# Patient Record
Sex: FEMALE | Race: WHITE | NOT HISPANIC OR LATINO | Employment: UNEMPLOYED | URBAN - METROPOLITAN AREA
[De-identification: names, ages, dates, MRNs, and addresses within clinical notes are randomized per-mention and may not be internally consistent; named-entity substitution may affect disease eponyms.]

---

## 2017-09-27 ENCOUNTER — ALLSCRIPTS OFFICE VISIT (OUTPATIENT)
Dept: OTHER | Facility: OTHER | Age: 47
End: 2017-09-27

## 2017-10-11 RX ORDER — LACTULOSE 20 G/30ML
SOLUTION ORAL
Status: ON HOLD | COMMUNITY
End: 2017-10-13 | Stop reason: ALTCHOICE

## 2017-10-11 RX ORDER — ALPRAZOLAM 0.25 MG/1
TABLET ORAL
COMMUNITY
End: 2018-09-11 | Stop reason: HOSPADM

## 2017-10-11 RX ORDER — ESCITALOPRAM OXALATE 10 MG/1
20 TABLET ORAL DAILY
COMMUNITY
End: 2017-11-16 | Stop reason: HOSPADM

## 2017-10-11 RX ORDER — PANTOPRAZOLE SODIUM 40 MG/1
40 TABLET, DELAYED RELEASE ORAL 2 TIMES DAILY
COMMUNITY
End: 2021-11-10 | Stop reason: ALTCHOICE

## 2017-10-12 ENCOUNTER — ANESTHESIA EVENT (OUTPATIENT)
Dept: PERIOP | Facility: HOSPITAL | Age: 47
End: 2017-10-12
Payer: MEDICARE

## 2017-10-13 ENCOUNTER — GENERIC CONVERSION - ENCOUNTER (OUTPATIENT)
Dept: OTHER | Facility: OTHER | Age: 47
End: 2017-10-13

## 2017-10-13 ENCOUNTER — ANESTHESIA (OUTPATIENT)
Dept: PERIOP | Facility: HOSPITAL | Age: 47
End: 2017-10-13
Payer: MEDICARE

## 2017-10-13 ENCOUNTER — HOSPITAL ENCOUNTER (OUTPATIENT)
Facility: HOSPITAL | Age: 47
Setting detail: OUTPATIENT SURGERY
Discharge: HOME | End: 2017-10-13
Attending: INTERNAL MEDICINE | Admitting: INTERNAL MEDICINE
Payer: MEDICARE

## 2017-10-13 VITALS
HEIGHT: 66 IN | RESPIRATION RATE: 20 BRPM | SYSTOLIC BLOOD PRESSURE: 119 MMHG | WEIGHT: 219.2 LBS | TEMPERATURE: 97.5 F | HEART RATE: 61 BPM | OXYGEN SATURATION: 95 % | DIASTOLIC BLOOD PRESSURE: 71 MMHG | BODY MASS INDEX: 35.23 KG/M2

## 2017-10-13 DIAGNOSIS — K21.9 GERD WITHOUT ESOPHAGITIS: ICD-10-CM

## 2017-10-13 DIAGNOSIS — R10.9 ABDOMINAL PAIN: ICD-10-CM

## 2017-10-13 DIAGNOSIS — K59.09 CHRONIC CONSTIPATION: ICD-10-CM

## 2017-10-13 DIAGNOSIS — K58.1 IRRITABLE BOWEL SYNDROME WITH CONSTIPATION: ICD-10-CM

## 2017-10-13 PROCEDURE — 88342 IMHCHEM/IMCYTCHM 1ST ANTB: CPT | Performed by: INTERNAL MEDICINE

## 2017-10-13 PROCEDURE — 88305 TISSUE EXAM BY PATHOLOGIST: CPT | Performed by: INTERNAL MEDICINE

## 2017-10-13 RX ORDER — PROPOFOL 10 MG/ML
INJECTION, EMULSION INTRAVENOUS AS NEEDED
Status: DISCONTINUED | OUTPATIENT
Start: 2017-10-13 | End: 2017-10-13 | Stop reason: SURG

## 2017-10-13 RX ORDER — SODIUM CHLORIDE, SODIUM LACTATE, POTASSIUM CHLORIDE, CALCIUM CHLORIDE 600; 310; 30; 20 MG/100ML; MG/100ML; MG/100ML; MG/100ML
125 INJECTION, SOLUTION INTRAVENOUS CONTINUOUS
Status: DISCONTINUED | OUTPATIENT
Start: 2017-10-13 | End: 2017-10-13 | Stop reason: HOSPADM

## 2017-10-13 RX ORDER — CLONIDINE HYDROCHLORIDE 0.2 MG/1
0.2 TABLET ORAL
Status: ON HOLD | COMMUNITY
End: 2018-09-11

## 2017-10-13 RX ADMIN — SODIUM CHLORIDE, POTASSIUM CHLORIDE, SODIUM LACTATE AND CALCIUM CHLORIDE 125 ML/HR: 600; 310; 30; 20 INJECTION, SOLUTION INTRAVENOUS at 08:52

## 2017-10-13 RX ADMIN — PROPOFOL 50 MG: 10 INJECTION, EMULSION INTRAVENOUS at 09:18

## 2017-10-13 RX ADMIN — PROPOFOL 30 MG: 10 INJECTION, EMULSION INTRAVENOUS at 09:22

## 2017-10-13 RX ADMIN — PROPOFOL 150 MG: 10 INJECTION, EMULSION INTRAVENOUS at 09:15

## 2017-10-13 RX ADMIN — PROPOFOL 20 MG: 10 INJECTION, EMULSION INTRAVENOUS at 09:26

## 2017-10-13 NOTE — ANESTHESIA PREPROCEDURE EVALUATION
Review of Systems/Medical History  Patient summary reviewed        Cardiovascular  Negative cardio ROS Exercise tolerance: good,     Pulmonary  Negative pulmonary ROS ,        GI/Hepatic    GERD poorly controlled,        Negative  ROS        Endo/Other  Negative endo/other ROS      GYN  Negative gynecology ROS          Hematology  Negative hematology ROS      Musculoskeletal  Negative musculoskeletal ROS        Neurology  Negative neurology ROS      Psychology   Anxiety, Depression , bipolar disorder,            Physical Exam    Airway    Mallampati score: III  TM Distance: >3 FB  Neck ROM: full     Dental   Comment: Lower teeth in poor condition; #22 chipped, upper dentures,     Cardiovascular  Comment: Negative ROS, Rhythm: regular, Rate: normal, Cardiovascular exam normal    Pulmonary  Pulmonary exam normal Breath sounds clear to auscultation, Decreased breath sounds,     Other Findings        Anesthesia Plan  ASA Score- 2       Anesthesia Type- IV sedation with anesthesia with ASA Monitors  Additional Monitors:   Airway Plan:           Induction- intravenous  Informed Consent- Anesthetic plan and risks discussed with patient  I personally reviewed this patient with the CRNA  Discussed and agreed on the Anesthesia Plan with the CRNA  Sirisha Brush

## 2017-10-13 NOTE — OP NOTE
**** GI/ENDOSCOPY REPORT ****     PATIENT NAME: Leydi Ayala ------ VISIT ID:  Patient ID:   [id number] YOB: 1970     INTRODUCTION: Colonoscopy - A 49 female patient presents for an outpatient   Colonoscopy at [facility]  PREVIOUS COLONOSCOPY:     INDICATIONS: Abdominal pain  Constipation  CONSENT:  The benefits, risks, and alternatives to the procedure were   discussed and informed consent was obtained from the patient  PREPARATION: EKG, pulse, pulse oximetry and blood pressure were monitored   throughout the procedure  The patient was identified by myself both   verbally and by visual inspection of ID band  Airway Assessment   Classification: Airway class 2 - Visualization of the soft palate, fauces   and uvula  ASA Classification: Class 2 - Patient has mild to moderate   systemic disturbance that may or may not be related to the disorder   requiring surgery  The patient was kept NPO for eight hours prior to the   procedure  The patient received Golytely in preparation for the procedure  MEDICATIONS: Anesthesia-check records MAC anesthesia  PROCEDURE:  The endoscope was passed without difficulty through the anus   under direct visualization and advanced to the cecum, confirmed by   appendiceal orifice and ileocecal valve  The scope was withdrawn and the   mucosa was carefully examined  The quality of the preparation was good  Cecal Intubation Time: 2 minutes(s) Scope Withdrawal Time: 3 minutes(s)     RECTAL EXAM: Normal rectal exam      FINDINGS:  The cecum, ascending colon, hepatic flexure, transverse colon,   splenic flexure, descending colon, sigmoid colon, rectosigmoid junction,   and rectum appeared to be normal  On retroflexed view, internal   hemorrhoids were found  Vegetable matter obscured the view at times  COMPLICATIONS: There were no complications       IMPRESSIONS: Normal cecum, ascending colon, hepatic flexure, transverse   colon, splenic flexure, descending colon, sigmoid colon, rectosigmoid   junction, and rectum  Internal hemorrhoids  Vegetable matter obscured the   view at times  RECOMMENDATIONS: Linzess 145mcg QD  ESTIMATED BLOOD LOSS: None  PATHOLOGY SPECIMENS: No     PROCEDURE CODES: Colonoscopy     ICD-9 Codes: 789 00 Abdominal pain, unspecified site 564 00 Constipation,   unspecified     ICD-10 Codes: R10 Abdominal and pelvic pain K59 00 Constipation,   unspecified K64 9 Unspecified hemorrhoids     PERFORMED BY: VERONICA Bacon  on 10/13/2017  Version 1, electronically signed by VERONICA Ndiaye  on   10/13/2017 at 09:32

## 2017-10-13 NOTE — OP NOTE
**** GI/ENDOSCOPY REPORT ****     PATIENT NAME: SATYA ALEJANDRO - VISIT ID:  Patient ID: [id number]   YOB: 1970     INTRODUCTION: Esophagogastroduodenoscopy - A 49 female patient presents   for an outpatient Esophagogastroduodenoscopy at [facility]  INDICATIONS: GERD  CONSENT: The benefits, risks, and alternatives to the procedure were   discussed and informed consent was obtained from the patient  PREPARATION:  EKG, pulse, pulse oximetry and blood pressure were monitored   throughout the procedure  ASA Classification: Class 2 - Patient has mild   to moderate systemic disturbance that may or may not be related to the   disorder requiring surgery  The patient was kept NPO for eight hours prior   to the procedure  MEDICATIONS: MAC anesthesia  PROCEDURE:  The endoscope was passed without difficulty through the mouth   under direct visualization and advanced to the 2nd portion of the   duodenum  The scope was withdrawn and the mucosa was carefully examined  FINDINGS:   Esophagus: LA Class A, erosive, reflux-induced esophagitis was   found in the esophagus  On retroflexed view, there was a 1 cm hiatus   hernia visible in the esophagus  Stomach: The antrum, body of the   stomach, cardia, and fundus appeared to be normal  Multiple random   biopsies was taken  Duodenum: The duodenal bulb and 2nd portion of the   duodenum appeared to be normal  Multiple random biopsies was taken  COMPLICATIONS: There were no complications  IMPRESSIONS: Esophagitis seen  A hiatus hernia found  Normal antrum, body   of the stomach, cardia, and fundus  Normal duodenal bulb and 2nd portion   of the duodenum  Multiple biopsies taken  RECOMMENDATIONS: Anti-reflux measures: Raise the head of the bed 4 to 6   inches  Avoid smoking  Avoid excess coffee, tea or other caffeinated   beverages  Avoid garments that fit tightly through the abdomen  Avoid   eating before bed  Follow-up on the results of the biopsy specimens in 1   week  PPI  ESTIMATED BLOOD LOSS: None  PATHOLOGY SPECIMENS: Multiple random biopsies taken  Multiple random   biopsies taken  Yes     PROCEDURE CODES: 47686 - EGD flexible; with biopsy     ICD-9 Codes: 530 81 Esophageal reflux 553 3 Diaphragmatic hernia without   mention of obstruction or gangrene     ICD-10 Codes: K21 Gastro-esophageal reflux disease K20 9 Esophagitis,   unspecified K44 Diaphragmatic hernia     PERFORMED BY: VERONICA Buckley  on 10/13/2017  Version 1, electronically signed by VERONICA Benavides  on   10/13/2017 at 09:21

## 2017-10-13 NOTE — ANESTHESIA POSTPROCEDURE EVALUATION
Post-Op Assessment Note      CV Status:  Stable    Mental Status:  Awake    Hydration Status:  Stable    PONV Controlled:  None    Airway Patency:  Patent and adequate    Post Op Vitals Reviewed: Yes          Staff: AnesthesiologistBEREKET           BP   130/79   Temp     Pulse  65   Resp 18   SpO2   99

## 2017-10-13 NOTE — DISCHARGE INSTRUCTIONS
Upper Endoscopy   WHAT YOU NEED TO KNOW:   An upper endoscopy is also called an upper gastrointestinal (GI) endoscopy, or an esophagogastroduodenoscopy (EGD)  You may feel bloated, gassy, or have some abdominal discomfort after your procedure  Your throat may be sore for 24 to 36 hours  You may burp or pass gas from air that is still inside your body  DISCHARGE INSTRUCTIONS:   Call 911 for any of the following:   · You have sudden chest pain or trouble breathing  Seek care immediately if:   · You feel dizzy or faint  · You have trouble swallowing  · Your bowel movements are very dark or black  · Your abdomen is hard and firm and you have severe pain  · You vomit blood  Contact your healthcare provider if:   · You feel full or bloated and cannot burp or pass gas  · You have not had a bowel movement for 3 days after your procedure  · You have neck pain  · You have a fever or chills  · You have nausea or are vomiting  · You have a rash or hives  · You have questions or concerns about your endoscopy  Relieve a sore throat:  Suck on throat lozenges or crushed ice  Gargle with a small amount of warm salt water  Mix 1 teaspoon of salt and 1 cup of warm water to make salt water  Relieve gas and discomfort from bloating:  Lie on your right side with a heating pad on your abdomen  Take short walks to help pass gas  Eat small meals until bloating is relieved  Rest after your procedure: You have been given medicine to relax you  Do not  drive or make important decisions until the day after your procedure  Return to your normal activity as directed  You can usually return to work the day after your procedure  Follow up with your healthcare provider as directed:  Write down your questions so you remember to ask them during your visits     © 2017 [address] is for End User's use only and may not be sold, redistributed or otherwise used for commercial purposes  All illustrations and images included in CareNotes® are the copyrighted property of [employer]  or [employer]  The above information is an  only  It is not intended as medical advice for individual conditions or treatments  Talk to your doctor, nurse or pharmacist before following any medical regimen to see if it is safe and effective for you  Colonoscopy   WHAT YOU NEED TO KNOW:   A colonoscopy is a procedure to examine the inside of your colon (intestine) with a scope  Polyps or tissue growths may have been removed during your colonoscopy  It is normal to feel bloated and to have some abdominal discomfort  You should be passing gas  If you have hemorrhoids or you had polyps removed, you may have a small amount of bleeding  DISCHARGE INSTRUCTIONS:   Seek care immediately if:   · You have a large amount of bright red blood in your bowel movements  · Your abdomen is hard and firm and you have severe pain  · You have sudden trouble breathing  Contact your healthcare provider if:   · You develop a rash or hives  · You have a fever within 24 hours of your procedure       · You have not had a bowel movement for 3 days after your procedure  · You have questions or concerns about your condition or care  Activity:   · Do not lift, strain, or run  for 3 days after your procedure  · Rest after your procedure  You have been given medicine to relax you  Do not  drive or make important decisions until the day after your procedure  Return to your normal activity as directed  · Relieve gas and discomfort from bloating  by lying on your right side with a heating pad on your abdomen  You may need to take short walks to help the gas move out  Eat small meals until bloating is relieved  If you had polyps removed: For 7 days after your procedure:  · Do not  take aspirin  · Do not  go on long car rides    Follow up with your healthcare provider as directed: Write down your questions so you remember to ask them during your visits  © 2017 [address] is for End User's use only and may not be sold, redistributed or otherwise used for commercial purposes  All illustrations and images included in CareNotes® are the copyrighted property of [employer]  or [address]  The above information is an  only  It is not intended as medical advice for individual conditions or treatments  Talk to your doctor, nurse or pharmacist before following any medical regimen to see if it is safe and effective for you

## 2017-10-19 ENCOUNTER — GENERIC CONVERSION - ENCOUNTER (OUTPATIENT)
Dept: OTHER | Facility: OTHER | Age: 47
End: 2017-10-19

## 2017-11-10 ENCOUNTER — HOSPITAL ENCOUNTER (EMERGENCY)
Facility: HOSPITAL | Age: 47
End: 2017-11-11
Attending: EMERGENCY MEDICINE | Admitting: EMERGENCY MEDICINE
Payer: MEDICARE

## 2017-11-10 VITALS
TEMPERATURE: 98.3 F | HEART RATE: 78 BPM | RESPIRATION RATE: 18 BRPM | DIASTOLIC BLOOD PRESSURE: 62 MMHG | OXYGEN SATURATION: 97 % | SYSTOLIC BLOOD PRESSURE: 109 MMHG

## 2017-11-10 DIAGNOSIS — R45.89 SUICIDAL BEHAVIOR: Primary | ICD-10-CM

## 2017-11-10 DIAGNOSIS — F41.8 DEPRESSION WITH ANXIETY: ICD-10-CM

## 2017-11-10 LAB — ETHANOL EXG-MCNC: 0 MG/DL

## 2017-11-10 PROCEDURE — 82075 ASSAY OF BREATH ETHANOL: CPT | Performed by: EMERGENCY MEDICINE

## 2017-11-10 RX ORDER — DICYCLOMINE HCL 20 MG
20 TABLET ORAL ONCE
Status: COMPLETED | OUTPATIENT
Start: 2017-11-10 | End: 2017-11-11

## 2017-11-10 RX ORDER — LORAZEPAM 1 MG/1
1 TABLET ORAL ONCE
Status: COMPLETED | OUTPATIENT
Start: 2017-11-10 | End: 2017-11-11

## 2017-11-10 NOTE — LETTER
[address] [address]  Dept: [phone]              EMTALA TRANSFER CONSENT    NAME Kennedy Puga                            1970                              MRN [id number]    I have been informed of my rights regarding examination, treatment, and transfer   by Dr Susannah Heart, *    Benefits: Other benefits (Include comment)_______________________ (Inpt psych tx)    Risks: Potential deterioration of medical condition      Consent for Transfer:  I acknowledge that my medical condition has been evaluated and explained to me by the emergency department physician or other qualified medical person and/or my attending physician, who has recommended that I be transferred to the service of  Accepting Physician: Dr Cyn Mccarty at [address] [place] [address] Name, [address] : [address]  The above potential benefits of such transfer, the potential risks associated with such transfer, and the probable risks of not being transferred have been explained to me, and I fully understand them  The doctor has explained that, in my case, the benefits of transfer outweigh the risks  I agree to be transferred  I authorize the performance of emergency medical procedures and treatments upon me in both transit and upon arrival at the receiving facility  Additionally, I authorize the release of any and all medical records to the receiving facility and request they be transported with me, if possible  I understand that the safest mode of transportation during a medical emergency is an ambulance and that the Hospital advocates the use of this mode of transport  Risks of traveling to the receiving facility by car, including absence of medical control, life sustaining equipment, such as oxygen, and medical personnel has been explained to me and I fully understand them      (RICHARD CORRECT BOX BELOW)  [ X ]  I consent to the stated transfer and to be transported by ambulance/helicopter  [  ]  I consent to the stated transfer, but refuse transportation by ambulance and accept full responsibility for my transportation by car  I understand the risks of non-ambulance transfers and I exonerate the Hospital and its staff from any deterioration in my condition that results from this refusal     X___________________________________________    DATE  17  TIME________  Signature of patient or legally responsible individual signing on patient behalf           RELATIONSHIP TO PATIENT_________________________                      Provider Certification    NAME Noe Auguste                               1970                              MRN [id number]    A medical screening exam was performed on the above named patient  Based on the examination:    Condition Necessitating Transfer Suicidal ideations, depression    Patient Condition: The patient has been stabilized such that within reasonable medical probability, no material deterioration of the patient condition or the condition of the unborn child(sonja) is likely to result from the transfer    Reason for Transfer: Level of Care needed not available at this facility    Transfer Requirements: [address]   · Space available and qualified personnel available for treatment as acknowledged by Bel Dupont  · Agreed to accept transfer and to provide appropriate medical treatment as acknowledged by       Dr Itzel Chance  · Appropriate medical records of the examination and treatment of the patient are provided at the time of transfer   [facility], [address] __DC_____  · Transfer will be performed by qualified personnel from [facility] [phone]  and appropriate transfer equipment as required, including the use of necessary and appropriate life support measures      Provider Certification: I have examined the patient and explained the following risks and benefits of being transferred/refusing transfer to the patient/family:  General risk, such as traffic hazards, adverse weather conditions, rough terrain or turbulence, possible failure of equipment (including vehicle or aircraft), or consequences of actions of persons outside the control of the transport personnel      Based on these reasonable risks and benefits to the patient and/or the unborn child(sonja), and based upon the information available at the time of the patients examination, I certify that the medical benefits reasonably to be expected from the provision of appropriate medical treatments at another medical facility outweigh the increasing risks, if any, to the individuals medical condition, and in the case of labor to the unborn child, from effecting the transfer      X____________________________________________ DATE 11/11/17        TIME_______      ORIGINAL - SEND TO MEDICAL RECORDS   COPY - SEND WITH PATIENT DURING TRANSFER

## 2017-11-11 ENCOUNTER — HOSPITAL ENCOUNTER (INPATIENT)
Facility: HOSPITAL | Age: 47
LOS: 5 days | Discharge: HOME | DRG: 885 | End: 2017-11-16
Attending: PSYCHIATRY & NEUROLOGY | Admitting: PSYCHIATRY & NEUROLOGY
Payer: MEDICARE

## 2017-11-11 DIAGNOSIS — F31.4 BIPOLAR 1 DISORDER, DEPRESSED, SEVERE (HCC): ICD-10-CM

## 2017-11-11 DIAGNOSIS — N39.0 UTI (URINARY TRACT INFECTION): ICD-10-CM

## 2017-11-11 DIAGNOSIS — K59.00 CONSTIPATION: Primary | ICD-10-CM

## 2017-11-11 PROBLEM — F43.10 PTSD (POST-TRAUMATIC STRESS DISORDER): Status: ACTIVE | Noted: 2017-11-11

## 2017-11-11 LAB
AMPHETAMINES SERPL QL SCN: NEGATIVE
BACTERIA UR QL AUTO: ABNORMAL /HPF
BARBITURATES UR QL: NEGATIVE
BENZODIAZ UR QL: NEGATIVE
BILIRUB UR QL STRIP: NEGATIVE
CLARITY UR: CLEAR
COCAINE UR QL: NEGATIVE
COLOR UR: YELLOW
GLUCOSE UR STRIP-MCNC: NEGATIVE MG/DL
HGB UR QL STRIP.AUTO: NEGATIVE
KETONES UR STRIP-MCNC: NEGATIVE MG/DL
LEUKOCYTE ESTERASE UR QL STRIP: NEGATIVE
METHADONE UR QL: NEGATIVE
NITRITE UR QL STRIP: POSITIVE
NON-SQ EPI CELLS URNS QL MICRO: ABNORMAL /HPF
OPIATES UR QL SCN: NEGATIVE
PCP UR QL: NEGATIVE
PH UR STRIP.AUTO: 5.5 [PH] (ref 4.5–8)
PROT UR STRIP-MCNC: NEGATIVE MG/DL
RBC #/AREA URNS AUTO: ABNORMAL /HPF
SP GR UR STRIP.AUTO: >=1.03 (ref 1–1.03)
THC UR QL: NEGATIVE
UROBILINOGEN UR QL STRIP.AUTO: 0.2 E.U./DL
WBC #/AREA URNS AUTO: ABNORMAL /HPF

## 2017-11-11 PROCEDURE — 93005 ELECTROCARDIOGRAM TRACING: CPT | Performed by: PSYCHIATRY & NEUROLOGY

## 2017-11-11 PROCEDURE — 99285 EMERGENCY DEPT VISIT HI MDM: CPT

## 2017-11-11 PROCEDURE — 81001 URINALYSIS AUTO W/SCOPE: CPT | Performed by: PSYCHIATRY & NEUROLOGY

## 2017-11-11 PROCEDURE — 93005 ELECTROCARDIOGRAM TRACING: CPT

## 2017-11-11 PROCEDURE — 80307 DRUG TEST PRSMV CHEM ANLYZR: CPT | Performed by: EMERGENCY MEDICINE

## 2017-11-11 RX ORDER — HALOPERIDOL 5 MG/ML
5 INJECTION INTRAMUSCULAR EVERY 6 HOURS PRN
Status: DISCONTINUED | OUTPATIENT
Start: 2017-11-11 | End: 2017-11-16 | Stop reason: HOSPADM

## 2017-11-11 RX ORDER — ZIPRASIDONE HYDROCHLORIDE 40 MG/1
40 CAPSULE ORAL 2 TIMES DAILY WITH MEALS
Status: DISCONTINUED | OUTPATIENT
Start: 2017-11-11 | End: 2017-11-16 | Stop reason: HOSPADM

## 2017-11-11 RX ORDER — MAGNESIUM HYDROXIDE/ALUMINUM HYDROXICE/SIMETHICONE 120; 1200; 1200 MG/30ML; MG/30ML; MG/30ML
30 SUSPENSION ORAL EVERY 4 HOURS PRN
Status: DISCONTINUED | OUTPATIENT
Start: 2017-11-11 | End: 2017-11-16 | Stop reason: HOSPADM

## 2017-11-11 RX ORDER — ZOLPIDEM TARTRATE 5 MG/1
5 TABLET ORAL
Status: DISCONTINUED | OUTPATIENT
Start: 2017-11-11 | End: 2017-11-16 | Stop reason: HOSPADM

## 2017-11-11 RX ORDER — LORAZEPAM 2 MG/ML
1 INJECTION INTRAMUSCULAR EVERY 6 HOURS PRN
Status: DISCONTINUED | OUTPATIENT
Start: 2017-11-11 | End: 2017-11-16 | Stop reason: HOSPADM

## 2017-11-11 RX ORDER — MAGNESIUM HYDROXIDE/ALUMINUM HYDROXICE/SIMETHICONE 120; 1200; 1200 MG/30ML; MG/30ML; MG/30ML
30 SUSPENSION ORAL EVERY 4 HOURS PRN
Status: CANCELLED | OUTPATIENT
Start: 2017-11-11

## 2017-11-11 RX ORDER — LORAZEPAM 1 MG/1
1 TABLET ORAL EVERY 6 HOURS PRN
Status: CANCELLED | OUTPATIENT
Start: 2017-11-11

## 2017-11-11 RX ORDER — TRAZODONE HYDROCHLORIDE 50 MG/1
50 TABLET ORAL
Status: DISCONTINUED | OUTPATIENT
Start: 2017-11-11 | End: 2017-11-16 | Stop reason: HOSPADM

## 2017-11-11 RX ORDER — CLONIDINE HYDROCHLORIDE 0.1 MG/1
0.2 TABLET ORAL
Status: DISCONTINUED | OUTPATIENT
Start: 2017-11-11 | End: 2017-11-16 | Stop reason: HOSPADM

## 2017-11-11 RX ORDER — ZOLPIDEM TARTRATE 5 MG/1
5 TABLET ORAL
Status: CANCELLED | OUTPATIENT
Start: 2017-11-11

## 2017-11-11 RX ORDER — LORAZEPAM 2 MG/ML
1 INJECTION INTRAMUSCULAR EVERY 6 HOURS PRN
Status: CANCELLED | OUTPATIENT
Start: 2017-11-11

## 2017-11-11 RX ORDER — HALOPERIDOL 5 MG
5 TABLET ORAL EVERY 6 HOURS PRN
Status: DISCONTINUED | OUTPATIENT
Start: 2017-11-11 | End: 2017-11-16 | Stop reason: HOSPADM

## 2017-11-11 RX ORDER — HALOPERIDOL 5 MG
5 TABLET ORAL EVERY 6 HOURS PRN
Status: CANCELLED | OUTPATIENT
Start: 2017-11-11

## 2017-11-11 RX ORDER — PANTOPRAZOLE SODIUM 40 MG/1
40 TABLET, DELAYED RELEASE ORAL
Status: DISCONTINUED | OUTPATIENT
Start: 2017-11-11 | End: 2017-11-16 | Stop reason: HOSPADM

## 2017-11-11 RX ORDER — HALOPERIDOL 5 MG/ML
5 INJECTION INTRAMUSCULAR EVERY 6 HOURS PRN
Status: CANCELLED | OUTPATIENT
Start: 2017-11-11

## 2017-11-11 RX ORDER — BENZTROPINE MESYLATE 1 MG/ML
1 INJECTION INTRAMUSCULAR; INTRAVENOUS EVERY 6 HOURS PRN
Status: DISCONTINUED | OUTPATIENT
Start: 2017-11-11 | End: 2017-11-16 | Stop reason: HOSPADM

## 2017-11-11 RX ORDER — BENZTROPINE MESYLATE 1 MG/1
1 TABLET ORAL EVERY 6 HOURS PRN
Status: CANCELLED | OUTPATIENT
Start: 2017-11-11

## 2017-11-11 RX ORDER — BENZTROPINE MESYLATE 1 MG/1
1 TABLET ORAL EVERY 6 HOURS PRN
Status: DISCONTINUED | OUTPATIENT
Start: 2017-11-11 | End: 2017-11-16 | Stop reason: HOSPADM

## 2017-11-11 RX ORDER — BENZTROPINE MESYLATE 1 MG/ML
1 INJECTION INTRAMUSCULAR; INTRAVENOUS EVERY 6 HOURS PRN
Status: CANCELLED | OUTPATIENT
Start: 2017-11-11

## 2017-11-11 RX ORDER — ESCITALOPRAM OXALATE 10 MG/1
10 TABLET ORAL DAILY
Status: DISCONTINUED | OUTPATIENT
Start: 2017-11-11 | End: 2017-11-13

## 2017-11-11 RX ORDER — LORAZEPAM 1 MG/1
1 TABLET ORAL EVERY 6 HOURS PRN
Status: DISCONTINUED | OUTPATIENT
Start: 2017-11-11 | End: 2017-11-16 | Stop reason: HOSPADM

## 2017-11-11 RX ORDER — ALPRAZOLAM 0.25 MG/1
0.25 TABLET ORAL
Status: DISCONTINUED | OUTPATIENT
Start: 2017-11-11 | End: 2017-11-16 | Stop reason: HOSPADM

## 2017-11-11 RX ORDER — TRAZODONE HYDROCHLORIDE 50 MG/1
50 TABLET ORAL
Status: CANCELLED | OUTPATIENT
Start: 2017-11-11

## 2017-11-11 RX ADMIN — ESCITALOPRAM OXALATE 10 MG: 10 TABLET ORAL at 13:11

## 2017-11-11 RX ADMIN — ZIPRASIDONE HCL 40 MG: 40 CAPSULE ORAL at 16:17

## 2017-11-11 RX ADMIN — PANTOPRAZOLE SODIUM 40 MG: 40 TABLET, DELAYED RELEASE ORAL at 16:17

## 2017-11-11 RX ADMIN — LORAZEPAM 1 MG: 1 TABLET ORAL at 12:08

## 2017-11-11 RX ADMIN — DICYCLOMINE HYDROCHLORIDE 20 MG: 20 TABLET ORAL at 01:23

## 2017-11-11 RX ADMIN — LORAZEPAM 1 MG: 1 TABLET ORAL at 01:23
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
## 2017-11-11 NOTE — ED NOTES
Per patients request, her fiance Jayden Rolo, is to be contacted to  vehicle  [place] given to [place] in security  Markus's number is [phone]  Attempt to call was made and voicemail was left  Will try again in the morning         Thony Pierce  11/11/17 0127
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
## 2017-11-11 NOTE — ED NOTES
Pt closed her door so that it remained open only a crack  She turned off her light and is laying quietly in bed  No signs of distress  No needs at this time  Will continue to monitor       Quinton Pierce  11/11/17 0023
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
## 2017-11-11 NOTE — PROGRESS NOTES
Belongings at bedside  Shoes  [address]    Belongings in locker  Adaptor   Coat  Sweat pants WITH string  Tank top

## 2017-11-11 NOTE — ED NOTES
In network bed in [facility]  201 was faxed for review       Maykel Singh, GELAW  11/11/17   1649

## 2017-11-11 NOTE — ED NOTES
Call received from Myke Best at [facility] who stated pt was accepted by Dr Angelina Juarez at [facility]       Contacted [facility], spoke with Vjiay Munguia to set up transport for 2am      *NURSE TO CALL REPORT TO [address] [place] [address] & [facility]  11/11/17   4545

## 2017-11-11 NOTE — ED NOTES
[facility] arrived to transport patient  All belongings other than vehicle keys were given to [facility]  Pts keys were passed on to Security so that pts fiance can  vehicle       Lara Pierce  11/11/17 0146

## 2017-11-11 NOTE — TREATMENT PLAN
RN will meet with patient at least 2x a day to assess for signs and symptoms of admission, teach about prescribed medications and diagnosis, and teach and encourage the use of healthy coping skills

## 2017-11-11 NOTE — ED NOTES
Attempted to contact pt's nicole, Amy Krysten, as per her request to ask that he  the car key since her car is here  Message was left       Follow up with Elena Ballard: [address]  11/11/17   0103

## 2017-11-12 LAB
ALBUMIN SERPL BCP-MCNC: 3.1 G/DL (ref 3.5–5)
ALP SERPL-CCNC: 81 U/L (ref 46–116)
ALT SERPL W P-5'-P-CCNC: 29 U/L (ref 12–78)
ANION GAP SERPL CALCULATED.3IONS-SCNC: 6 MMOL/L (ref 4–13)
AST SERPL W P-5'-P-CCNC: 19 U/L (ref 5–45)
BASOPHILS # BLD AUTO: 0.02 THOUSANDS/ΜL (ref 0–0.1)
BASOPHILS NFR BLD AUTO: 0 % (ref 0–1)
BILIRUB SERPL-MCNC: 0.3 MG/DL (ref 0.2–1)
BUN SERPL-MCNC: 21 MG/DL (ref 5–25)
CALCIUM SERPL-MCNC: 8.8 MG/DL (ref 8.3–10.1)
CHLORIDE SERPL-SCNC: 105 MMOL/L (ref 100–108)
CHOLEST SERPL-MCNC: 183 MG/DL (ref 50–200)
CO2 SERPL-SCNC: 30 MMOL/L (ref 21–32)
CREAT SERPL-MCNC: 0.88 MG/DL (ref 0.6–1.3)
EOSINOPHIL # BLD AUTO: 0.14 THOUSAND/ΜL (ref 0–0.61)
EOSINOPHIL NFR BLD AUTO: 3 % (ref 0–6)
ERYTHROCYTE [DISTWIDTH] IN BLOOD BY AUTOMATED COUNT: 13.7 % (ref 11.6–15.1)
GFR SERPL CREATININE-BSD FRML MDRD: 78 ML/MIN/1.73SQ M
GLUCOSE P FAST SERPL-MCNC: 82 MG/DL (ref 65–99)
GLUCOSE SERPL-MCNC: 82 MG/DL (ref 65–140)
HCT VFR BLD AUTO: 39.7 % (ref 34.8–46.1)
HDLC SERPL-MCNC: 48 MG/DL (ref 40–60)
HGB BLD-MCNC: 13.1 G/DL (ref 11.5–15.4)
LDLC SERPL CALC-MCNC: 116 MG/DL (ref 0–100)
LYMPHOCYTES # BLD AUTO: 2.04 THOUSANDS/ΜL (ref 0.6–4.47)
LYMPHOCYTES NFR BLD AUTO: 36 % (ref 14–44)
MCH RBC QN AUTO: 29.7 PG (ref 26.8–34.3)
MCHC RBC AUTO-ENTMCNC: 33 G/DL (ref 31.4–37.4)
MCV RBC AUTO: 90 FL (ref 82–98)
MONOCYTES # BLD AUTO: 0.47 THOUSAND/ΜL (ref 0.17–1.22)
MONOCYTES NFR BLD AUTO: 8 % (ref 4–12)
NEUTROPHILS # BLD AUTO: 3.04 THOUSANDS/ΜL (ref 1.85–7.62)
NEUTS SEG NFR BLD AUTO: 53 % (ref 43–75)
PLATELET # BLD AUTO: 322 THOUSANDS/UL (ref 149–390)
PMV BLD AUTO: 9.8 FL (ref 8.9–12.7)
POTASSIUM SERPL-SCNC: 3.9 MMOL/L (ref 3.5–5.3)
PROT SERPL-MCNC: 7.1 G/DL (ref 6.4–8.2)
RBC # BLD AUTO: 4.41 MILLION/UL (ref 3.81–5.12)
SODIUM SERPL-SCNC: 141 MMOL/L (ref 136–145)
TRIGL SERPL-MCNC: 97 MG/DL
TSH SERPL DL<=0.05 MIU/L-ACNC: 0.49 UIU/ML (ref 0.36–3.74)
WBC # BLD AUTO: 5.71 THOUSAND/UL (ref 4.31–10.16)

## 2017-11-12 PROCEDURE — 84443 ASSAY THYROID STIM HORMONE: CPT | Performed by: PSYCHIATRY & NEUROLOGY

## 2017-11-12 PROCEDURE — 86592 SYPHILIS TEST NON-TREP QUAL: CPT | Performed by: PSYCHIATRY & NEUROLOGY

## 2017-11-12 PROCEDURE — 80053 COMPREHEN METABOLIC PANEL: CPT | Performed by: PSYCHIATRY & NEUROLOGY

## 2017-11-12 PROCEDURE — 85025 COMPLETE CBC W/AUTO DIFF WBC: CPT | Performed by: PSYCHIATRY & NEUROLOGY

## 2017-11-12 PROCEDURE — 80061 LIPID PANEL: CPT | Performed by: PSYCHIATRY & NEUROLOGY

## 2017-11-12 RX ORDER — IBUPROFEN 400 MG/1
400 TABLET ORAL EVERY 6 HOURS PRN
Status: DISCONTINUED | OUTPATIENT
Start: 2017-11-12 | End: 2017-11-16 | Stop reason: HOSPADM

## 2017-11-12 RX ADMIN — ALPRAZOLAM 0.25 MG: 0.25 TABLET ORAL at 21:08

## 2017-11-12 RX ADMIN — PANTOPRAZOLE SODIUM 40 MG: 40 TABLET, DELAYED RELEASE ORAL at 06:01

## 2017-11-12 RX ADMIN — CLONIDINE HYDROCHLORIDE 0.2 MG: 0.1 TABLET ORAL at 21:08

## 2017-11-12 RX ADMIN — ZIPRASIDONE HCL 40 MG: 40 CAPSULE ORAL at 07:53

## 2017-11-12 RX ADMIN — ZIPRASIDONE HCL 40 MG: 40 CAPSULE ORAL at 16:27

## 2017-11-12 RX ADMIN — PANTOPRAZOLE SODIUM 40 MG: 40 TABLET, DELAYED RELEASE ORAL at 16:27

## 2017-11-12 RX ADMIN — ESCITALOPRAM OXALATE 10 MG: 10 TABLET ORAL at 08:27

## 2017-11-12 NOTE — PROGRESS NOTES
Progress Note - [address] 49 y o  female MRN: @MRN   Unit/Bed#: [facility] 253-01 Encounter: [id number]    Patient had no issues overnight  Madhuri Grace reports today that Depression is a 5/10, anxiety is down from before  No SI, no HI  No side effects  Appetite down still but improving  Slept very well  Energy good  Sleep: normal  Appetite: improving  Medication side effects: No   ROS: no complaints    Mental Status Evaluation:    Appearance:  age appropriate   Behavior:  pleasant, cooperative, with good eye contact   Speech:  regular rate and rhythm   Mood:  euthymic, depressed and anxious   Affect:  mood congruent       Thought Process:  Linear and goal directed   Associations: intact associations   Thought Content:  normal and appropriate   Perceptual Disturbances: no auditory or visual hallcunations   Risk Potential: Suicidal ideation - None at present, would not feel safe if discharged  Homicidal ideation - None  Potential for aggression - No   Sensorium:  A&Ox3   Cognition:  grossly intact   Consciousness:  alert and awake   Memory:  recent and remote memory grossly intact   Attention: attention span and concentration are age appropriate   Intellect: within normal limits       Insight:  fair   Judgment: fair         Gait/Station: normal gait/station with good balance   Motor Activity: no abnormal movements     Vital signs in last 24 hours:    Temp:  [97 7 °F (36 5 °C)-98 5 °F (36 9 °C)] 98 4 °F (36 9 °C)  HR:  [60-71] 62  Resp:  [18-19] 18  BP: (106-132)/(55-73) 106/57    Laboratory results:  I have personally reviewed all pertinent laboratory/tests results  Progress Toward Goals:  slow improvement  Assessment/Plan   Principal Problem:    Bipolar 1 disorder, depressed, severe (HCC)  Active Problems:    PTSD (post-traumatic stress disorder)      Madhuri Grace is tolerating meds well, showing some improvement in secure environment   Needs continued hospitalization for stabilization and safety at this time  Recommended Treatment:     Planned medication and treatment changes: All current active medications have been reviewed  cloNIDine 0 2 mg Oral HS   escitalopram 10 mg Oral Daily   Linaclotide 145 mcg Oral Daily   pantoprazole 40 mg Oral BID AC   ziprasidone 40 mg Oral BID With Meals       1) Continue current medications  2) Fiance visiting today    Risks / Benefits of Treatment:    Risks, benefits, and possible side effects of medications explained to patient and patient verbalizes understanding and agreement for treatment  Counseling / Coordination of Care:    Medication changes reviewed with nursing staff  Medications, treatment progress and treatment plan reviewed with patient        Deven Rubi III, DO  11/12/2017  8:46 AM

## 2017-11-12 NOTE — PROGRESS NOTES
Pt pleasant and cooperative, social with peers, and present on the unit  Pt denies SI/HI, but reports depression anxiety  Pt appears to be using coping skill to help with anxiety level  Pt denies having any question or concerns, nurse will continue to monitor

## 2017-11-13 LAB
ATRIAL RATE: 70 BPM
ATRIAL RATE: 71 BPM
P AXIS: 59 DEGREES
P AXIS: 64 DEGREES
PR INTERVAL: 174 MS
PR INTERVAL: 182 MS
QRS AXIS: 54 DEGREES
QRS AXIS: 58 DEGREES
QRSD INTERVAL: 78 MS
QRSD INTERVAL: 86 MS
QT INTERVAL: 402 MS
QT INTERVAL: 404 MS
QTC INTERVAL: 436 MS
QTC INTERVAL: 436 MS
RPR SER QL: NORMAL
T WAVE AXIS: 44 DEGREES
T WAVE AXIS: 55 DEGREES
VENTRICULAR RATE: 70 BPM
VENTRICULAR RATE: 71 BPM

## 2017-11-13 RX ORDER — ESCITALOPRAM OXALATE 10 MG/1
10 TABLET ORAL DAILY
Status: COMPLETED | OUTPATIENT
Start: 2017-11-14 | End: 2017-11-14

## 2017-11-13 RX ADMIN — PANTOPRAZOLE SODIUM 40 MG: 40 TABLET, DELAYED RELEASE ORAL at 16:19

## 2017-11-13 RX ADMIN — PANTOPRAZOLE SODIUM 40 MG: 40 TABLET, DELAYED RELEASE ORAL at 06:11

## 2017-11-13 RX ADMIN — ZIPRASIDONE HCL 40 MG: 40 CAPSULE ORAL at 16:19

## 2017-11-13 RX ADMIN — ESCITALOPRAM OXALATE 10 MG: 10 TABLET ORAL at 08:12

## 2017-11-13 RX ADMIN — CLONIDINE HYDROCHLORIDE 0.2 MG: 0.1 TABLET ORAL at 21:05

## 2017-11-13 RX ADMIN — ZIPRASIDONE HCL 40 MG: 40 CAPSULE ORAL at 08:12

## 2017-11-13 RX ADMIN — ALPRAZOLAM 0.25 MG: 0.25 TABLET ORAL at 21:07

## 2017-11-13 NOTE — PLAN OF CARE
Problem: DISCHARGE PLANNING  Goal: Discharge to home or other facility with appropriate resources  INTERVENTIONS:  - Identify barriers to discharge w/patient and caregiver  - Arrange for needed discharge resources and transportation as appropriate  - Identify discharge learning needs (meds, wound care, etc )  - Arrange for interpretive services to assist at discharge as needed  - Refer to Case Management Department for coordinating discharge planning if the patient needs post-hospital services based on physician/advanced practitioner order or complex needs related to functional status, cognitive ability, or social support system   Outcome: Progressing  Pt met with CM to review and sign Tx Plan and ROIs and complete initial intake

## 2017-11-13 NOTE — PLAN OF CARE
Problem: Ineffective Coping  Goal: Participates in unit activities  Interventions:  - Provide therapeutic environment   - Provide required programming   - Redirect inappropriate behaviors    Outcome: Progressing  Pt attended a m  Groups  She completed the self assessment identifying her major stressors as her sister being diagnosed with cancer and her son abusing meth  Pt identified positive activities for coping as music and reading  Pt more seclusive in the afternoon and declined to attend life skills group

## 2017-11-13 NOTE — PROGRESS NOTES
Pt pleasant In conversation  States she had a visit with her nicolee and that went well- he picked up her car from [facility]  She reports he also brought in her medication for her IBS  Pt reports feeling better than when she came in  Reports she likes the Geodon  Denies SI/HI or agitation  PT states she has a job interview Wednesday so she would like to be discharged for that  Needed encouragement to attend wrap up group but did attend  Denies questions/concerns  Denies med side effects
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
## 2017-11-13 NOTE — PROGRESS NOTES
Progress Note - [address] 49 y o  female MRN: [id number]  Unit/Bed#: [facility] 253-01 Encounter: [id number]    Assessment/Plan   Principal Problem:    Bipolar 1 disorder, depressed, severe ([address] )  Active Problems:    PTSD (post-traumatic stress disorder)      Subjective:  Patient remained compliant with medications with no acute side effects  Her reason for admission was discussed in detail including recent worsening of agitation, anger with suicidal ideations to overdose on medication and nonspecific homicidal ideations to but neighbor  Patient is denying homicidal ideations during evaluation today and regretting for making those statements  Patient reports history of bipolar and recent worsening of mood swings  Patient is tolerating recent addition of Geodon with no acute side effects  She agreed with taper of Lexapro tomorrow and assessing if her depression gets worse  Patient agreed with plan of discharge if her mood remains stable on Geodon only  I reviewed her EKG and most recent QTC is 436 millisecond  Current Medications:    cloNIDine 0 2 mg Oral HS   [START ON 11/14/2017] escitalopram 10 mg Oral Daily   Linaclotide 145 mcg Oral Daily   pantoprazole 40 mg Oral BID AC   ziprasidone 40 mg Oral BID With Meals       Behavioral Health Medications: all current active meds have been reviewed  Vital signs in last 24 hours:  Temp:  [97 2 °F (36 2 °C)-97 3 °F (36 3 °C)] 97 2 °F (36 2 °C)  HR:  [75-87] 87  Resp:  [18-19] 18  BP: (102-125)/(56-73) 102/56    Laboratory results:    I have personally reviewed all pertinent laboratory/tests results    Labs in last 72 hours:   Recent Labs      11/12/17   0617  11/12/17   0618   WBC  5 71   --    RBC  4 41   --    HGB  13 1   --    HCT  39 7   --    PLT  322   --    RDW  13 7   --    NEUTROABS  3 04   --    NA  141   --    K  3 9   --    CL  105   --    CO2  30   --    BUN  21   --    CREATININE  0 88   --    GLUCOSE  82   --    CALCIUM  8 8   -- AST  19   --    ALT  29   --    ALKPHOS  81   --    PROT  7 1   --    ALBUMIN  3 1*   --    BILITOT  0 30   --    CHOL  183   --    HDL  48   --    TRIG  97   --    LDLCALC  116*   --    QMH1WKBXUZLL  0 488   --    RPR   --   Non-Reactive     Admission Labs:   Admission on 11/11/2017   Component Date Value    Clarity, UA 11/11/2017 Clear     Color, UA 11/11/2017 Yellow     Specific Gravity, UA 11/11/2017 >=1 030     pH, UA 11/11/2017 5 5     Glucose, UA 11/11/2017 Negative     Ketones, UA 11/11/2017 Negative     Blood, UA 11/11/2017 Negative     Protein, UA 11/11/2017 Negative     Nitrite, UA 11/11/2017 Positive*    Bilirubin, UA 11/11/2017 Negative     Urobilinogen, UA 11/11/2017 0 2     Leukocytes, UA 11/11/2017 Negative     WBC, UA 11/11/2017 4-10*    RBC, UA 11/11/2017 None Seen     Bacteria, UA 11/11/2017 Innumerable*    Epithelial Cells 11/11/2017 Moderate*    Ventricular Rate 11/13/2017 70     Atrial Rate 11/13/2017 70     MS Interval 11/13/2017 174     QRSD Interval 11/13/2017 86     QT Interval 11/13/2017 404     QTC Interval 11/13/2017 436     P Axis 11/13/2017 64     QRS Axis 11/13/2017 58     T Wave Axis 11/13/2017 44     WBC 11/12/2017 5 71     RBC 11/12/2017 4 41     Hemoglobin 11/12/2017 13 1     Hematocrit 11/12/2017 39 7     MCV 11/12/2017 90     MCH 11/12/2017 29 7     MCHC 11/12/2017 33 0     RDW 11/12/2017 13 7     MPV 11/12/2017 9 8     Platelets 91/32/9179 322     Neutrophils Relative 11/12/2017 53     Lymphocytes Relative 11/12/2017 36     Monocytes Relative 11/12/2017 8     Eosinophils Relative 11/12/2017 3     Basophils Relative 11/12/2017 0     Neutrophils Absolute 11/12/2017 3 04     Lymphocytes Absolute 11/12/2017 2 04     Monocytes Absolute 11/12/2017 0 47     Eosinophils Absolute 11/12/2017 0 14     Basophils Absolute 11/12/2017 0 02     TSH 3RD GENERATON 11/12/2017 0 488     Sodium 11/12/2017 141     Potassium 11/12/2017 3 9     Chloride 11/12/2017 105     CO2 11/12/2017 30     Anion Gap 11/12/2017 6     BUN 11/12/2017 21     Creatinine 11/12/2017 0 88     Glucose 11/12/2017 82     Glucose, Fasting 11/12/2017 82     Calcium 11/12/2017 8 8     AST 11/12/2017 19     ALT 11/12/2017 29     Alkaline Phosphatase 11/12/2017 81     Total Protein 11/12/2017 7 1     Albumin 11/12/2017 3 1*    Total Bilirubin 11/12/2017 0 30     eGFR 11/12/2017 78     RPR 11/13/2017 Non-Reactive     Cholesterol 11/12/2017 183     Triglycerides 11/12/2017 97     HDL, Direct 11/12/2017 48     LDL Calculated 11/12/2017 116*       Psychiatric Review of Systems:  Behavior over the last 24 hours:  improving  Sleep: normal  Appetite: normal  Medication side effects: No  ROS: no complaints    Mental Status Evaluation:  Appearance:  casually dressed   Behavior:  guarded   Speech:  soft   Mood:  angry, anxious and depressed   Affect:  constricted   Language naming objects and repeating phrases   Thought Process:  circumstantial   Thought Content:  obsessions   Perceptual Disturbances: None   Risk Potential: Suicidal Ideations without plan, Homicidal Ideations none and Potential for Aggression No   Sensorium:  person and place   Cognition:  grossly intact   Consciousness:  awake    Attention: attention span appeared shorter than expected for age   Insight:  limited   Judgment: limited   Intellect fair   Gait/Station: normal gait/station   Motor Activity: no abnormal movements     Progress Toward Goals: slow progress    Recommended Treatment:   Taper Lexapro and finish taper tomorrow- to prevent risk of manic switch  Continue with group therapy, milieu therapy and occupational therapy      Continue following current medications:   cloNIDine 0 2 mg Oral HS   [START ON 11/14/2017] escitalopram 10 mg Oral Daily   Linaclotide 145 mcg Oral Daily   pantoprazole 40 mg Oral BID AC   ziprasidone 40 mg Oral BID With Meals       Risks, benefits and possible side effects of Medications:   Risks, benefits, and possible side effects of medications explained to patient and patient verbalizes understanding  Risks of medications in pregnancy explained if female patient  Patient verbalizes understanding and agrees to notify her doctor if she becomes pregnant  This note has been constructed using a voice recognition system  There may be translation, syntax,  or grammatical errors  If you have any questions, please contact the dictating provider

## 2017-11-13 NOTE — PLAN OF CARE
Problem: Ineffective Coping  Goal: Identifies healthy coping skills  Outcome: Progressing      Problem: Risk for Violence/Aggression Toward Others  Goal: Refrain from harming others  Outcome: Progressing    Goal: Control angry outbursts  Interventions:  - Monitor patient closely, per order  - Ensure early verbal de-escalation  - Monitor prn medication needs  - Set reasonable/therapeutic limits, outline behavioral expectations, and consequences   - Provide a non-threatening milieu, utilizing the least restrictive interventions    Outcome: Progressing      Problem: SELF HARM/SUICIDALITY  Goal: Will have no self-injury during hospital stay  INTERVENTIONS:  - Q 15 MINUTES: Routine safety checks  - Q WAKING SHIFT & PRN: Assess risk to determine if routine checks are adequate to maintain patient safety  - Encourage patient to participate actively in care by formulating a plan to combat response to suicidal ideation, identify supports and resources   Outcome: Progressing      Problem: DEPRESSION  Goal: Will be euthymic at discharge  INTERVENTIONS:  - Administer medication as ordered  - Provide emotional support via 1:1 interaction with staff  - Encourage involvement in milieu/groups/activities  - Monitor for social isolation   Outcome: Progressing

## 2017-11-13 NOTE — PROGRESS NOTES
Pt states the Geodon is helpful; enjoys the relaxation/meditation group  States anxiety and depression have improved; denies S/H/I or AVH  She shared she is glad that her Duane Galvan was brought in and expects that it will be effective soon; as she had a dose this AM  Pt has been compliant with meds; attends groups  Will continue to monitor, provide support and encouragement

## 2017-11-13 NOTE — PROGRESS NOTES
PRN xanax 0 25 mg given PO at 2108 for c/o difficulty falling asleep- effective  Pt appears to be sleeping in bed at this time

## 2017-11-14 RX ORDER — SULFAMETHOXAZOLE AND TRIMETHOPRIM 800; 160 MG/1; MG/1
1 TABLET ORAL EVERY 12 HOURS SCHEDULED
Status: DISCONTINUED | OUTPATIENT
Start: 2017-11-14 | End: 2017-11-16 | Stop reason: HOSPADM

## 2017-11-14 RX ADMIN — SULFAMETHOXAZOLE AND TRIMETHOPRIM 1 TABLET: 800; 160 TABLET ORAL at 21:08

## 2017-11-14 RX ADMIN — SULFAMETHOXAZOLE AND TRIMETHOPRIM 1 TABLET: 800; 160 TABLET ORAL at 12:03

## 2017-11-14 RX ADMIN — PANTOPRAZOLE SODIUM 40 MG: 40 TABLET, DELAYED RELEASE ORAL at 16:04

## 2017-11-14 RX ADMIN — ESCITALOPRAM OXALATE 10 MG: 10 TABLET ORAL at 08:03

## 2017-11-14 RX ADMIN — ZIPRASIDONE HCL 40 MG: 40 CAPSULE ORAL at 16:04

## 2017-11-14 RX ADMIN — CLONIDINE HYDROCHLORIDE 0.2 MG: 0.1 TABLET ORAL at 21:09

## 2017-11-14 RX ADMIN — ZIPRASIDONE HCL 40 MG: 40 CAPSULE ORAL at 08:03

## 2017-11-14 RX ADMIN — PANTOPRAZOLE SODIUM 40 MG: 40 TABLET, DELAYED RELEASE ORAL at 06:15

## 2017-11-14 RX ADMIN — IBUPROFEN 400 MG: 400 TABLET, FILM COATED ORAL at 17:08

## 2017-11-14 NOTE — PLAN OF CARE
Problem: DISCHARGE PLANNING  Goal: Discharge to home or other facility with appropriate resources  INTERVENTIONS:  - Identify barriers to discharge w/patient and caregiver  - Arrange for needed discharge resources and transportation as appropriate  - Identify discharge learning needs (meds, wound care, etc )  - Arrange for interpretive services to assist at discharge as needed  - Refer to Case Management Department for coordinating discharge planning if the patient needs post-hospital services based on physician/advanced practitioner order or complex needs related to functional status, cognitive ability, or social support system   Outcome: Progressing  Pt is verbalizing she will be ready for d/c on Thursday  First available appointment 3/5/18, CM waiting to hear back from the only other provider in the area

## 2017-11-14 NOTE — CASE MANAGEMENT
NAVEEN contacted Dr Manny Mcclain office @ [phone] and spoke with Fadumo Jefferson, who reported they only had one physician which takes Medicare with a COB of [place], and the first available appointment is 3/5/18 @ 11 AM    NAVEEN reviewed she would check with the other provider in the area to see if they had something sooner, and possibly call back  NAVEEN contacted [address] [place] [address] Psychiatry @ [phone] but reached voicemail; CM left a message

## 2017-11-14 NOTE — PROGRESS NOTES
Pt pleasant on interaction  Reading in her room between group times  States ready for discharge time on Thursday  Positive when speaking about having a psychiatrist and therapist for supports after discharge  Expresses having good support with family and living situation is good  She also reports looking forward to job interview on Friday

## 2017-11-14 NOTE — PROGRESS NOTES
Pt is calm and cooperative  Pt is in high spirits during conversation; hard to tell if this was due to medication working or the beginning of a possible manic episode  Excited to be discharged this week and to see her family members this evening when they come to visit  Denies all symptoms  Speaks about being eager to start a new job, states "before I wasn't motivated to do anything, and now I do"  Patient contributes this change to her new medication, geodon   Denies si, hi

## 2017-11-14 NOTE — PLAN OF CARE
Problem: Ineffective Coping  Goal: Participates in unit activities  Interventions:  - Provide therapeutic environment   - Provide required programming   - Redirect inappropriate behaviors    Outcome: Progressing  Pt attended all groups  She presented as pleasant and hopeful  Pt verbalized feeling better and ready for discharge  She completed the 7-day post d/c schedule and demonstrated good insight into value of self care

## 2017-11-14 NOTE — CASE MANAGEMENT
CM met with Pt who reported feeling better, and verbalized she would be ready for discharge on Thursday  CM reviewed that she was able to get in contact with one psychiatrist office, however, their first available appointment was not until 3/5  CM reported she was hopeful the other Medicare provider would be able to see her sooner, however, CM left messages yesterday & today & has not gotten a call back yet  Pt reported her fiance, Kindra Álvaro, is planning to visit [facility] contacted Kindra Rea @ [phone] who reported he was on his way to visit with Pt  CM reviewed d/c planned for Thursday, and Kindra Rea confirmed he could provide transportation, and asked that it be early as possible  CM reviewed that 10 AM is usually the earliest Pt are ready for discharge, and he agreed he would come at that time

## 2017-11-14 NOTE — PROGRESS NOTES
Progress Note - [address] 49 y o  female MRN: [id number]  Unit/Bed#: [facility] 253-01 Encounter: [id number]    Assessment/Plan   Principal Problem:    Bipolar 1 disorder, depressed, severe ([address] )  Active Problems:    PTSD (post-traumatic stress disorder)      Subjective:  Patient reports feeling improved on the Geodon  Lexapro's last dosing was scheduled for today  Denied SSRI withdrawal symptoms  States depressive symptoms are less with improvements in appetite, sleep (with PRN Xanax), and self esteem  Did state she is no longer homicidal towards her neighbor  Has less suicidal thoughts and contracts for safety  Reports mood becoming more stabilized with reduction of lability  Denied PTSD related symptoms  Denied psychosis  Reports less irritability and does not show signs of stefan  Medication compliant  Tolerating medications well without serious side effects  Did mention increased urinary frequency and discomfort  WBCs with nitrites in urine        Current Medications:  Current Facility-Administered Medications   Medication Dose Route Frequency    ALPRAZolam (XANAX) tablet 0 25 mg  0 25 mg Oral HS PRN    aluminum-magnesium hydroxide-simethicone (MYLANTA) 200-200-20 mg/5 mL oral suspension 30 mL  30 mL Oral Q4H PRN    benztropine (COGENTIN) injection 1 mg  1 mg Intramuscular Q6H PRN    benztropine (COGENTIN) tablet 1 mg  1 mg Oral Q6H PRN    cloNIDine (CATAPRES) tablet 0 2 mg  0 2 mg Oral HS    haloperidol (HALDOL) tablet 5 mg  5 mg Oral Q6H PRN    haloperidol lactate (HALDOL) injection 5 mg  5 mg Intramuscular Q6H PRN    ibuprofen (MOTRIN) tablet 400 mg  400 mg Oral Q6H PRN    Linaclotide CAPS 145 mcg  145 mcg Oral Daily    LORazepam (ATIVAN) 2 mg/mL injection 1 mg  1 mg Intramuscular Q6H PRN    LORazepam (ATIVAN) tablet 1 mg  1 mg Oral Q6H PRN    magnesium hydroxide (MILK OF MAGNESIA) 400 mg/5 mL oral suspension 30 mL  30 mL Oral Daily PRN    pantoprazole (PROTONIX) EC tablet 40 mg  40 mg Oral BID AC    sulfamethoxazole-trimethoprim (BACTRIM DS) 800-160 mg per tablet 1 tablet  1 tablet Oral Q12H [address]    traZODone (DESYREL) tablet 50 mg  50 mg Oral HS PRN    ziprasidone (GEODON) capsule 40 mg  40 mg Oral BID With Meals    zolpidem (AMBIEN) tablet 5 mg  5 mg Oral HS PRN       Behavioral Health Medications: all current active meds have been reviewed and continue current psychiatric medications  Vitals:  Vitals:    11/14/17 0745   BP: 106/56   Pulse: 71   Resp: 18   Temp: 98 2 °F (36 8 °C)       Laboratory results:    I have personally reviewed all pertinent laboratory/tests results  Most Recent Labs:   Lab Results   Component Value Date    WBC 5 71 11/12/2017    RBC 4 41 11/12/2017    HGB 13 1 11/12/2017    HCT 39 7 11/12/2017     11/12/2017    RDW 13 7 11/12/2017    NEUTROABS 3 04 11/12/2017     11/12/2017    K 3 9 11/12/2017     11/12/2017    CO2 30 11/12/2017    BUN 21 11/12/2017    CREATININE 0 88 11/12/2017    GLUCOSE 82 11/12/2017    CALCIUM 8 8 11/12/2017    AST 19 11/12/2017    ALT 29 11/12/2017    ALKPHOS 81 11/12/2017    PROT 7 1 11/12/2017    ALBUMIN 3 1 (L) 11/12/2017    BILITOT 0 30 11/12/2017    CHOL 183 11/12/2017    HDL 48 11/12/2017    TRIG 97 11/12/2017    LDLCALC 116 (H) 11/12/2017    [id number] 0 488 11/12/2017    RPR Non-Reactive 11/12/2017       Psychiatric Review of Systems:  Behavior over the last 24 hours:  Slightly improved  Sleep: normal with PRN medications  Appetite: normal  Medication side effects: No  ROS: urinary discomfort and increased frequency    Mental Status Evaluation:  Appearance:  casually dressed   Behavior:  guarded   Speech:  soft   Mood:  anxious and less depressed   Affect:  mood-congruent and brighter   Language appropriate   Thought Process:  circumstantial   Thought Content:  normal   Denied delusions/obsessions   Perceptual Disturbances: None   Risk Potential: Reduced passive death wishes  Denied HI  Potential for aggression: No   Sensorium:  person, place and time/date   Cognition:  grossly intact   Consciousness:  alert and awake    Attention: attention span appeared shorter than expected for age   Insight:  partial   Judgment: partial   Gait/Station: normal gait/station and normal balance   Motor Activity: no abnormal movements     Progress Toward Goals: progressing slowly    Recommended Treatment: Continue with group therapy, milieu therapy and occupational therapy  1   Will add Bactrim DS q12 for 3 days for suspected UTI  2  Continue other medications  3  Last dose of Lexapro today  4  Disposition planning     Risks, benefits and possible side effects of Medications:   Risks, benefits, and possible side effects of medications explained to patient and patient verbalizes understanding        Francine Cook PA-C

## 2017-11-14 NOTE — PROGRESS NOTES
Patient happy and cooperative  Says she is happy that she is on correct medication  She's on the phone frequently with family members  She denies SI and Hi  Looking forward to going home  Is calm and cooperative with care  Asked if she had pain medication for hip fusion  Let her know that she does  She was appreciative, but does not currently need it

## 2017-11-15 RX ORDER — ZIPRASIDONE HYDROCHLORIDE 40 MG/1
40 CAPSULE ORAL 2 TIMES DAILY WITH MEALS
Qty: 60 CAPSULE | Refills: 0 | Status: SHIPPED | OUTPATIENT
Start: 2017-11-15 | End: 2018-09-11 | Stop reason: HOSPADM

## 2017-11-15 RX ORDER — SULFAMETHOXAZOLE AND TRIMETHOPRIM 800; 160 MG/1; MG/1
1 TABLET ORAL EVERY 12 HOURS SCHEDULED
Qty: 1 TABLET | Refills: 0 | Status: SHIPPED | OUTPATIENT
Start: 2017-11-15 | End: 2017-11-16

## 2017-11-15 RX ADMIN — ZIPRASIDONE HCL 40 MG: 40 CAPSULE ORAL at 08:12

## 2017-11-15 RX ADMIN — CLONIDINE HYDROCHLORIDE 0.2 MG: 0.1 TABLET ORAL at 21:04

## 2017-11-15 RX ADMIN — ZIPRASIDONE HCL 40 MG: 40 CAPSULE ORAL at 15:51

## 2017-11-15 RX ADMIN — IBUPROFEN 400 MG: 400 TABLET, FILM COATED ORAL at 16:03

## 2017-11-15 RX ADMIN — PANTOPRAZOLE SODIUM 40 MG: 40 TABLET, DELAYED RELEASE ORAL at 06:05

## 2017-11-15 RX ADMIN — ALPRAZOLAM 0.25 MG: 0.25 TABLET ORAL at 21:06

## 2017-11-15 RX ADMIN — PANTOPRAZOLE SODIUM 40 MG: 40 TABLET, DELAYED RELEASE ORAL at 15:51

## 2017-11-15 RX ADMIN — SULFAMETHOXAZOLE AND TRIMETHOPRIM 1 TABLET: 800; 160 TABLET ORAL at 08:13

## 2017-11-15 RX ADMIN — SULFAMETHOXAZOLE AND TRIMETHOPRIM 1 TABLET: 800; 160 TABLET ORAL at 21:04

## 2017-11-15 NOTE — CASE MANAGEMENT
NAVEEN contacted [facility] Psychiatry @ [phone] & spoke with Ronald Choi, who was able to schedule Pt an intake appointment for 12/5 at 2 PM with Estefani HODGE confirmed fax number [phone] to send discharge information  NAVEEN contacted [place] @ [phone] to let Luther Goldmann know not to hold the 3/5/18 appointment any longer

## 2017-11-15 NOTE — DISCHARGE INSTRUCTIONS
Bipolar Disorder   WHAT YOU NEED TO KNOW:   Bipolar disorder is a long-term chemical imbalance that causes rapid changes in mood and behavior  High moods are called stefan  Low moods are called depression  Sometimes you will feel manic and sometimes you will feel depressed  You can have alternating episodes of stefan and depression  This is called a mixed bipolar state  DISCHARGE INSTRUCTIONS:   Call 911 if:   · You think about hurting yourself or someone else  Contact your healthcare provider or psychiatrist if:   · You are having trouble managing your bipolar disorder  · You cannot sleep, or are sleeping all the time  · You cannot eat, or are eating more than usual     · You feel dizzy or your stomach is upset  · You cannot make it to your next meeting  · You have questions or concerns about your condition or care  Medicines:   · Medicines  may be given to help keep your mood stable, or to help you sleep  Changes in medicine are often needed as your bipolar disorder changes  · Take your medicine as directed  Contact your healthcare provider if you think your medicine is not helping or if you have side effects  Tell him or her if you are allergic to any medicine  Keep a list of the medicines, vitamins, and herbs you take  Include the amounts, and when and why you take them  Bring the list or the pill bottles to follow-up visits  Carry your medicine list with you in case of an emergency  Follow up with your healthcare provider or psychiatrist as directed:  Write down your questions so you remember to ask them during your visits  Manage bipolar disorder:  Watch for triggers of bipolar disorder symptoms, such as stress  Learn new ways to relax, such as deep breathing, to manage your stress  Tell someone if you feel a manic or depressive period might be coming on  Ask a friend or family member to help watch you for bipolar symptoms   Work to develop skills that will help you manage bipolar disorder  You may need to make lifestyle changes  Ask your healthcare provider or psychiatrist for resources  For support and more information:   · [address] [facility]), [address] [facility], [address] [facility], [address]  [place] [address]   Phone: [phone]  Phone: [phone]  Web Address: [facility]  · Depression and [address] (DBS[employer])  [address] [place] [address], [address] [place] [address] , [address]  Phone: [phone]  Web Address: [employer]  org   © 2017 [facility] Information is for End User's use only and may not be sold, redistributed or otherwise used for commercial purposes  All illustrations and images included in CareNotes® are the copyrighted property of [employer]  or Jm Moore  The above information is an  only  It is not intended as medical advice for individual conditions or treatments  Talk to your doctor, nurse or pharmacist before following any medical regimen to see if it is safe and effective for you

## 2017-11-15 NOTE — CASE MANAGEMENT
CM met Pt to review her that she was able to get a sooner medication management appointment, and she was happy about this  Pt said that her fiance, Bhupendra English, and her 31 y/o daughter visited last night & it went well  CM and Pt reviewed and signed IMM  Pt is agreeable with plan for discharge tomorrow  She said that she is typically in the hospital a lot longer, however, she feels that because she came in before things got really bad, she is ready sooner  Pt confirmed Bhupendra English would pick her up in the morning

## 2017-11-15 NOTE — PLAN OF CARE

## 2017-11-15 NOTE — DISCHARGE INSTR - OTHER ORDERS
24[address];  Call: [phone]  TTY: [phone]  Toll Free: [phone]  Raegan [address] [place] [address] [phone]    Crisis Intervention   [facility] Crisis Telephone Service [phone] provides 24 hour, 7 day a week crisis phone service for any individual in mental health crisis in [facility]  The telephone service is a hotline, manned by a trained professional  Individuals can receive support, information and referral services 24 hours a day 7 days a week  Crisis Text Line is free, 24/7 support for those in crisis  Text HOME to 666596 from anywhere in the [place] to text with a trained Crisis Counselor  The [address] is a toll free telephone line that can be accessed by consumers who are looking for someone to talk to for support or guidance  It is a support service that is designed to promote recovery while focusing on strengths and providing guidance  This line is run by a provider, but is staffed with consumers and/or certified peer specialists who receive a great deal of training and supervision  MH/DS funds this program  The phone number is [phone]  The hours of operation are from 6 PM to 10 PM daily  NAME SPECIALITY INSURANCES/PAYMENTS ADDRESS/PHONE   Depression/Bipolar Support Group 1st & 3rd Wednesdays of each Month  7:00 pm  9:00 pm [facility] [address]  [phone]   JEANCARLOS ([facility] on [address] [place] [address]) Meetings 1st & 3rd Wednesday at [facility] - JOSE 7 p m      Free          [phone] [facility]) or    [phone] Otilio Greenberg

## 2017-11-15 NOTE — PROGRESS NOTES
Progress Note - [address] 49 y o  female MRN: [id number]  Unit/Bed#: [facility] 253-01 Encounter: [id number]    Assessment/Plan   Principal Problem:    Bipolar 1 disorder, depressed, severe ([address] )  Active Problems:    PTSD (post-traumatic stress disorder)      Subjective:  No signs of withdrawal from Lexapro discontinuance  Reports no adverse effects on mood  Depressive symptoms are decreased with reduced passive death wishes  Reports did not require PRN Xanax last night for sleep  Denied homicidal thoughts and potential serious side effects  No cardiac complaints  Denied PTSD related symptoms  Denied psychosis and does not show signs of stefan  Medication compliant  Tolerating medications well with no serious side effects  No urinary complaints  Tentative discharge tomorrow      Current Medications:  Current Facility-Administered Medications   Medication Dose Route Frequency    ALPRAZolam (XANAX) tablet 0 25 mg  0 25 mg Oral HS PRN    aluminum-magnesium hydroxide-simethicone (MYLANTA) 200-200-20 mg/5 mL oral suspension 30 mL  30 mL Oral Q4H PRN    benztropine (COGENTIN) injection 1 mg  1 mg Intramuscular Q6H PRN    benztropine (COGENTIN) tablet 1 mg  1 mg Oral Q6H PRN    cloNIDine (CATAPRES) tablet 0 2 mg  0 2 mg Oral HS    haloperidol (HALDOL) tablet 5 mg  5 mg Oral Q6H PRN    haloperidol lactate (HALDOL) injection 5 mg  5 mg Intramuscular Q6H PRN    ibuprofen (MOTRIN) tablet 400 mg  400 mg Oral Q6H PRN    Linaclotide CAPS 145 mcg  145 mcg Oral Daily    LORazepam (ATIVAN) 2 mg/mL injection 1 mg  1 mg Intramuscular Q6H PRN    LORazepam (ATIVAN) tablet 1 mg  1 mg Oral Q6H PRN    magnesium hydroxide (MILK OF MAGNESIA) 400 mg/5 mL oral suspension 30 mL  30 mL Oral Daily PRN    pantoprazole (PROTONIX) EC tablet 40 mg  40 mg Oral BID AC    sulfamethoxazole-trimethoprim (BACTRIM DS) 800-160 mg per tablet 1 tablet  1 tablet Oral Q12H [address]    traZODone (DESYREL) tablet 50 mg  50 mg Oral HS PRN    ziprasidone (GEODON) capsule 40 mg  40 mg Oral BID With Meals    zolpidem (AMBIEN) tablet 5 mg  5 mg Oral HS PRN       Behavioral Health Medications: all current active meds have been reviewed and continue current psychiatric medications  Vitals:  Vitals:    11/15/17 0751   BP: 109/65   Pulse: 65   Resp: 18   Temp: 98 5 °F (36 9 °C)       Laboratory results:    I have personally reviewed all pertinent laboratory/tests results  Most Recent Labs:   Lab Results   Component Value Date    WBC 5 71 11/12/2017    RBC 4 41 11/12/2017    HGB 13 1 11/12/2017    HCT 39 7 11/12/2017     11/12/2017    RDW 13 7 11/12/2017    NEUTROABS 3 04 11/12/2017     11/12/2017    K 3 9 11/12/2017     11/12/2017    CO2 30 11/12/2017    BUN 21 11/12/2017    CREATININE 0 88 11/12/2017    GLUCOSE 82 11/12/2017    CALCIUM 8 8 11/12/2017    AST 19 11/12/2017    ALT 29 11/12/2017    ALKPHOS 81 11/12/2017    PROT 7 1 11/12/2017    ALBUMIN 3 1 (L) 11/12/2017    BILITOT 0 30 11/12/2017    CHOL 183 11/12/2017    HDL 48 11/12/2017    TRIG 97 11/12/2017    LDLCALC 116 (H) 11/12/2017    [id number] 0 488 11/12/2017    RPR Non-Reactive 11/12/2017       Psychiatric Review of Systems:  Behavior over the last 24 hours:  Slightly improved  Sleep: normal   Appetite: normal  Medication side effects: No  ROS: no complaints    Mental Status Evaluation:  Appearance:  casually dressed   Behavior:  Less guarded   Speech:  normal pitch and normal volume   Mood:  less anxious and depressed   Affect:  brighter   Language appropriate   Thought Process:  circumstantial   Thought Content:  normal   Perceptual Disturbances: normal   Denied delusions/obsessions   Risk Potential: Reduced passive death wishes  Denied HI   Potential for aggression: No   Sensorium:  person, place and time/date   Cognition:  grossly intact   Consciousness:  alert and awake    Attention: attention span appeared shorter than expected for age   Insight: partial   Judgment: fair   Gait/Station: normal gait/station and normal balance   Motor Activity: no abnormal movements     Progress Toward Goals: progressing slowly    Recommended Treatment: Continue with group therapy, milieu therapy and occupational therapy  1   Continue current medications  2  Disposition planning with tentative discharge tomorrow    Risks, benefits and possible side effects of Medications:   Risks, benefits, and possible side effects of medications explained to patient and patient verbalizes understanding        Alberto Ritchie PA-C

## 2017-11-15 NOTE — PROGRESS NOTES
Pt is pleasant during conversation  Pt reports waking up at 0400 this morning and being unable to fall back asleep  Looking forward to discharge tomorrow  Denies anxiety and depression at this time  C/o right hip pain and received a prn for this  Visible on the unit   Denies si, hi

## 2017-11-15 NOTE — DISCHARGE INSTR - APPOINTMENTS
Friday, November 17, 2017 at 9:45 AM: therapy appointment with Bailey Pugh at [facility]      Tuesday, December 5, 2017 at 2:00 PM: intake appointment with Vandy Runner for medication management at [facility] (Dr Margaux Irwin)

## 2017-11-16 VITALS
TEMPERATURE: 97.7 F | SYSTOLIC BLOOD PRESSURE: 103 MMHG | WEIGHT: 221 LBS | HEIGHT: 66 IN | BODY MASS INDEX: 35.52 KG/M2 | RESPIRATION RATE: 10 BRPM | DIASTOLIC BLOOD PRESSURE: 55 MMHG | HEART RATE: 62 BPM

## 2017-11-16 RX ADMIN — ZIPRASIDONE HCL 40 MG: 40 CAPSULE ORAL at 08:09

## 2017-11-16 RX ADMIN — SULFAMETHOXAZOLE AND TRIMETHOPRIM 1 TABLET: 800; 160 TABLET ORAL at 08:09

## 2017-11-16 RX ADMIN — PANTOPRAZOLE SODIUM 40 MG: 40 TABLET, DELAYED RELEASE ORAL at 06:07

## 2017-11-16 NOTE — CASE MANAGEMENT
CM met with Pt who verbalized readiness for discharge  Pt had no questions regarding her follow-up appointments  Pt's fiance arrived early and Pt was excited to be going home

## 2017-11-16 NOTE — PLAN OF CARE
Problem: Risk for Violence/Aggression Toward Others  Goal: Refrain from harming others  Outcome: Completed Date Met: 11/16/17    Goal: Control angry outbursts  Interventions:  - Monitor patient closely, per order  - Ensure early verbal de-escalation  - Monitor prn medication needs  - Set reasonable/therapeutic limits, outline behavioral expectations, and consequences   - Provide a non-threatening milieu, utilizing the least restrictive interventions    Outcome: Completed Date Met: 11/16/17      Problem: SELF HARM/SUICIDALITY  Goal: Will have no self-injury during hospital stay  INTERVENTIONS:  - Q 15 MINUTES: Routine safety checks  - Q WAKING SHIFT & PRN: Assess risk to determine if routine checks are adequate to maintain patient safety  - Encourage patient to participate actively in care by formulating a plan to combat response to suicidal ideation, identify supports and resources   Outcome: Completed Date Met: 11/16/17

## 2017-11-16 NOTE — DISCHARGE SUMMARY
Discharge Summary - [address] 49 y o  female MRN: [id number]  Unit/Bed#: [facility] 253-01 Encounter: [id number]     Admission Date: 11/11/2017         Discharge Date: 11/16/2017    Attending Psychiatrist: Clary Gonzalez MD    Reason for Admission/HPI:   History of Present Illness   Patient is a 49year old female who presented to [facility] ED due to suicidal ideations with plan to overdose on medications  Additionally, she reported generalized homicidal ideations without plan towards her neighbors  Precipitating events are family issues, neighbor issues, and job loss  Over the last 6 months patient reported a gradual decrease in mood with symptoms of irritability, decreased sleep, lack of appetite, lack of energy, anhedonia, and unintentional weight gain  She also reported history of stefan lasting up to 3 weeks with symptoms of increased spending, grandiosity, racing thoughts, rapid speech, goal directed behavior, and decreased need for sleep  She only presented with irritability  She denied psychotic symptoms  Patient also reported history of trauma with rarely occurred PTSD symptoms of flashbacks, avoidance, hypervigilance, increased startle reflex, increased negative emotions, difficulty with finding positive emotions, negative self view, sleep disturbances, and concentration difficulties    Patient does have prior inpatient psychiatric hospitalizations and was not in treatment with outpatient psychiatrist       Psychosocial Stressors: family, residential, occupational     Hospital Course:   Behavioral Health Medications:   current meds:   Current Facility-Administered Medications   Medication Dose Route Frequency    ALPRAZolam (XANAX) tablet 0 25 mg  0 25 mg Oral HS PRN    aluminum-magnesium hydroxide-simethicone (MYLANTA) 200-200-20 mg/5 mL oral suspension 30 mL  30 mL Oral Q4H PRN    benztropine (COGENTIN) injection 1 mg  1 mg Intramuscular Q6H PRN    benztropine (COGENTIN) tablet 1 mg  1 mg Oral Q6H PRN    cloNIDine (CATAPRES) tablet 0 2 mg  0 2 mg Oral HS    haloperidol (HALDOL) tablet 5 mg  5 mg Oral Q6H PRN    haloperidol lactate (HALDOL) injection 5 mg  5 mg Intramuscular Q6H PRN    ibuprofen (MOTRIN) tablet 400 mg  400 mg Oral Q6H PRN    Linaclotide CAPS 145 mcg  145 mcg Oral Daily    LORazepam (ATIVAN) 2 mg/mL injection 1 mg  1 mg Intramuscular Q6H PRN    LORazepam (ATIVAN) tablet 1 mg  1 mg Oral Q6H PRN    magnesium hydroxide (MILK OF MAGNESIA) 400 mg/5 mL oral suspension 30 mL  30 mL Oral Daily PRN    pantoprazole (PROTONIX) EC tablet 40 mg  40 mg Oral BID AC    sulfamethoxazole-trimethoprim (BACTRIM DS) 800-160 mg per tablet 1 tablet  1 tablet Oral Q12H DANE    traZODone (DESYREL) tablet 50 mg  50 mg Oral HS PRN    ziprasidone (GEODON) capsule 40 mg  40 mg Oral BID With Meals    zolpidem (AMBIEN) tablet 5 mg  5 mg Oral HS PRN     Patient was admitted to [address] [place] [address] Inpatient Psychiatric Unit on voluntary 201 commitment for safety and stabilization  On admission patient was placed on Geodon 40mg BID for mood stabilization and reduced on Lexapro to 10mg QD due to activation of stefan  Patient was eventually discontinued off of Lexapro  She did not require further titrations of medications  She tolerated medications with no acute side effects  Her mood brightened over the course of her treatment, and she was seen in [facility] interacting appropriately with peers  She did not demonstrate dangerous behavior to self or others during her inpatient stay  On day of discharge patient had reduced depression, controllable anxiety, denied psychosis, did not show signs of stefan, and denied suicidal/homicidal ideations        Mental Status at time of Discharge:     Appearance:  casually dressed   Behavior:  cooperative   Speech:  normal pitch and normal volume   Mood:  euthymic   Affect:  mood-congruent   Thought Process:  normal   Thought Content: normal  Denied delusions/obsessions   Perceptual Disturbances: None   Risk Potential: Denied SI/HI  Potential for aggression: No   Sensorium:  person, place and time/date   Cognition:  grossly intact   Consciousness:  alert and awake    Attention: attention span and concentration were age appropriate   Insight:  fair   Judgment: fair   Gait/Station: normal gait/station and normal balance   Motor Activity: no abnormal movements     Discharge Diagnosis:   Bipolar 1 disorder, depressed, severe  PTSD    Discharge Medications:  See after visit summary for reconciled discharge medications provided to patient and family  Discharge instructions/Information to patient and family:   See after visit summary for information provided to patient and family  Provisions for Follow-Up Care:  See after visit summary for information related to follow-up care and any pertinent home health orders  Discharge Statement   I spent 32 minutes discharging the patient  This time was spent on the day of discharge  I had direct contact with the patient on the day of discharge  On day of discharge patient had mental status exam performed, discharge instructions/medications reviewed, and outpatient planning discussed  Patient was given 1 month of scripts  She did not require tobacco cessation therapy      Oly Mera PA-C

## 2017-11-17 NOTE — CASE MANAGEMENT
Pt's Summary of Care was electronically forwarded to [facility], [facility], and Dr Loli Myers

## 2018-01-13 VITALS
HEART RATE: 96 BPM | BODY MASS INDEX: 35.6 KG/M2 | WEIGHT: 221.5 LBS | DIASTOLIC BLOOD PRESSURE: 92 MMHG | HEIGHT: 66 IN | SYSTOLIC BLOOD PRESSURE: 144 MMHG

## 2018-01-16 NOTE — RESULT NOTES
Verified Results  (1) TISSUE EXAM [id number] 09:13AM Renay Agustin     Test Name Result Flag Reference   LAB AP CASE REPORT (Report)     Surgical Pathology Report             Case: [id number]                   Authorizing Provider: Samantha Suarez MD  Collected:      10/13/2017 0913        Ordering Location:   [facility] Received:      10/13/2017 1049                    Operating Room                                 Pathologist:      Ina Garcia MD                                Specimens:  A) - Duodenum, Rule out Celiac sprue                                  B) - Stomach, Rule out H Pylori   LAB AP FINAL DIAGNOSIS (Report)     A  Duodenum, Rule out Celiac sprue biopsy:   -Benign small intestinal mucosa with retained villous architecture and no   evidence of increased intraepithelial lymphocytes  -Mild non-specific chronic inflammation of lamina propria seen   -No evidence of dysplasia or malignancy      B  Stomach, Rule out H Pylori biopsy:   -Benign gastric-oxyntoantral type mucosa with mild chronic inflammation   and reactive surface foveolar epithelial changes consistent with reactive/   chemical gastritis   -No evidence of ulceration   -No evidence of dysplasia or malignancy   -No H Pylori organisms identified on immunohistochemical stained slide  Control reacted appropriately  Electronically signed by Ina Garcia MD on 10/18/2017 at 11:07 AM   LAB AP NOTE      Interpretation performed at [address] [place] [address]   LAB [address] [place] [address] (Report)     All controls performed with the immunohistochemical stains reported above   reacted appropriately  These tests were developed and their performance   characteristics determined by [facility] or   [employer]  They may not be cleared or approved by the [place]  Food and Drug Administration   The FDA has determined that such clearance   or approval is not necessary  These tests are used for clinical purposes  They should not be regarded as investigational or for research  This   laboratory has been approved by [place] [id number], designated as a high-complexity   laboratory and is qualified to perform these tests  LAB AP GROSS DESCRIPTION (Report)     A  The specimen is received in formalin, labeled with the patient's name   and hospital number, and is designated Duodenum biopsy rule out celiac   sprue  The specimen consists of 2 tan soft tissue fragments measuring 0 3   and 0 4 cm  Entirely submitted  One cassette  B  The specimen is received in formalin, labeled with the patient's name   and hospital number, and is designated Stomach biopsy rule out H    Pylori  The specimen consists of 4 tan soft tissue fragments each   measuring 0 3-0 4 cm  Entirely submitted  One cassette  Note: The estimated total formalin fixation time based upon information   provided by the submitting clinician and the standard processing schedule   is less than 72 hours      [facility]   [facility] CLINICAL INFORMATION Cold bx r/o c  sprue     Cold bx r/o c  sprue

## 2018-03-05 ENCOUNTER — TRANSCRIBE ORDERS (OUTPATIENT)
Dept: ADMINISTRATIVE | Facility: HOSPITAL | Age: 48
End: 2018-03-05

## 2018-03-05 DIAGNOSIS — E04.2 NONTOXIC MULTINODULAR GOITER: Primary | ICD-10-CM

## 2018-03-12 ENCOUNTER — HOSPITAL ENCOUNTER (OUTPATIENT)
Dept: ULTRASOUND IMAGING | Facility: HOSPITAL | Age: 48
Discharge: HOME | End: 2018-03-12
Payer: COMMERCIAL

## 2018-03-12 ENCOUNTER — APPOINTMENT (OUTPATIENT)
Dept: LAB | Facility: HOSPITAL | Age: 48
End: 2018-03-12
Payer: COMMERCIAL

## 2018-03-12 DIAGNOSIS — E04.2 NONTOXIC MULTINODULAR GOITER: ICD-10-CM

## 2018-03-12 LAB
T3FREE SERPL-MCNC: 2.4 PG/ML (ref 2.3–4.2)
T4 FREE SERPL-MCNC: 0.92 NG/DL (ref 0.76–1.46)
TSH SERPL DL<=0.05 MIU/L-ACNC: 1.27 UIU/ML (ref 0.36–3.74)

## 2018-03-12 PROCEDURE — 84439 ASSAY OF FREE THYROXINE: CPT

## 2018-03-12 PROCEDURE — 36415 COLL VENOUS BLD VENIPUNCTURE: CPT

## 2018-03-12 PROCEDURE — 86376 MICROSOMAL ANTIBODY EACH: CPT

## 2018-03-12 PROCEDURE — 76536 US EXAM OF HEAD AND NECK: CPT

## 2018-03-12 PROCEDURE — 84443 ASSAY THYROID STIM HORMONE: CPT

## 2018-03-12 PROCEDURE — 84481 FREE ASSAY (FT-3): CPT

## 2018-03-12 PROCEDURE — 86800 THYROGLOBULIN ANTIBODY: CPT

## 2018-03-13 LAB
THYROGLOB AB SERPL-ACNC: <1 IU/ML (ref 0–0.9)
THYROPEROXIDASE AB SERPL-ACNC: 12 IU/ML (ref 0–34)

## 2018-03-30 DIAGNOSIS — K59.00 CONSTIPATION: ICD-10-CM

## 2018-03-30 RX ORDER — LINACLOTIDE 145 UG/1
CAPSULE, GELATIN COATED ORAL
Qty: 30 CAPSULE | Refills: 0 | Status: SHIPPED | OUTPATIENT
Start: 2018-03-30 | End: 2018-06-04 | Stop reason: SDUPTHER

## 2018-06-04 DIAGNOSIS — K59.00 CONSTIPATION: ICD-10-CM

## 2018-06-05 RX ORDER — LINACLOTIDE 145 UG/1
CAPSULE, GELATIN COATED ORAL
Qty: 30 CAPSULE | Refills: 0 | Status: SHIPPED | OUTPATIENT
Start: 2018-06-05 | End: 2018-09-14 | Stop reason: SDUPTHER

## 2018-07-05 ENCOUNTER — HOSPITAL ENCOUNTER (EMERGENCY)
Facility: HOSPITAL | Age: 48
Discharge: HOME | End: 2018-07-06
Attending: EMERGENCY MEDICINE | Admitting: EMERGENCY MEDICINE
Payer: COMMERCIAL

## 2018-07-05 VITALS
DIASTOLIC BLOOD PRESSURE: 86 MMHG | SYSTOLIC BLOOD PRESSURE: 127 MMHG | OXYGEN SATURATION: 98 % | HEIGHT: 66 IN | RESPIRATION RATE: 18 BRPM | TEMPERATURE: 98.2 F | HEART RATE: 71 BPM | WEIGHT: 221 LBS | BODY MASS INDEX: 35.52 KG/M2

## 2018-07-05 DIAGNOSIS — M54.42 ACUTE BILATERAL LOW BACK PAIN WITH BILATERAL SCIATICA: Primary | ICD-10-CM

## 2018-07-05 DIAGNOSIS — M54.41 ACUTE BILATERAL LOW BACK PAIN WITH BILATERAL SCIATICA: Primary | ICD-10-CM

## 2018-07-06 PROCEDURE — 96372 THER/PROPH/DIAG INJ SC/IM: CPT

## 2018-07-06 PROCEDURE — 99283 EMERGENCY DEPT VISIT LOW MDM: CPT

## 2018-07-06 RX ORDER — KETOROLAC TROMETHAMINE 30 MG/ML
30 INJECTION, SOLUTION INTRAMUSCULAR; INTRAVENOUS ONCE
Status: COMPLETED | OUTPATIENT
Start: 2018-07-06 | End: 2018-07-06

## 2018-07-06 RX ORDER — LIDOCAINE 50 MG/G
1 PATCH TOPICAL ONCE
Status: DISCONTINUED | OUTPATIENT
Start: 2018-07-06 | End: 2018-07-06 | Stop reason: HOSPADM

## 2018-07-06 RX ORDER — CYCLOBENZAPRINE HCL 5 MG
5 TABLET ORAL 3 TIMES DAILY PRN
Qty: 21 TABLET | Refills: 0 | Status: SHIPPED | OUTPATIENT
Start: 2018-07-06 | End: 2018-09-11 | Stop reason: HOSPADM

## 2018-07-06 RX ADMIN — KETOROLAC TROMETHAMINE 30 MG: 30 INJECTION, SOLUTION INTRAMUSCULAR at 02:18

## 2018-07-06 RX ADMIN — LIDOCAINE 1 PATCH: 50 PATCH TOPICAL at 02:18

## 2018-07-06 NOTE — DISCHARGE INSTRUCTIONS
Take ibuprofen (Motrin, Advil) or acetaminophen (Tylenol) as needed for pain, as per the instructions  Use ice or heat as it helps  Use topical muscle rubs, such as Dillon-Briggs, or icy Hot to the area, to help with the pain  Use the prescribed pain medications as needed for continued pain  Do stretching exercises, and try to move around to keep the muscles of your back loose  Follow up for further evaluation and management of your pain  Sciatica   WHAT YOU NEED TO KNOW:   Sciatica is a condition that causes pain along your sciatic nerve  The sciatic nerve runs from your spine through both sides of your buttocks  It then runs down the back of your thigh, into your lower leg and foot  Your sciatic nerve may be compressed, inflamed, irritated, or stretched  DISCHARGE INSTRUCTIONS:   Medicines:   · NSAIDs:  These medicines decrease swelling and pain  NSAIDs are available without a doctor's order  Ask your healthcare provider which medicine is right for you  Ask how much to take and when to take it  Take as directed  NSAIDs can cause stomach bleeding or kidney problems if not taken correctly  · Acetaminophen: This medicine decreases pain  Acetaminophen is available without a doctor's order  Ask how much to take and when to take it  Follow directions  Acetaminophen can cause liver damage if not taken correctly  · Muscle relaxers  help decrease pain and muscle spasms  · Take your medicine as directed  Contact your healthcare provider if you think your medicine is not helping or if you have side effects  Tell him of her if you are allergic to any medicine  Keep a list of the medicines, vitamins, and herbs you take  Include the amounts, and when and why you take them  Bring the list or the pill bottles to follow-up visits  Carry your medicine list with you in case of an emergency    Follow up with your healthcare provider as directed:  Write down your questions so you remember to ask them during your visits  Manage your symptoms:   · Activity:  Decrease your activity  Do not lift heavy objects or twist your back for at least 6 weeks  Slowly return to your usual activity  · Ice:  Ice helps decrease swelling and pain  Ice may also help prevent tissue damage  Use an ice pack, or put crushed ice in a plastic bag  Cover it with a towel and place it on your low back or leg for 15 to 20 minutes every hour or as directed  · Heat:  Heat helps decrease pain and muscle spasms  Apply heat on the area for 20 to 30 minutes every 2 hours for as many days as directed  · Physical therapy:  You may need to see physical therapist to teach you exercises to help improve movement and strength, and to decrease pain  An occupational therapist teaches you skills to help with your daily activities  · Use assistive devices if directed: You may need to wear back support, such as a back brace  You may need crutches, a cane, or a walker to decrease stress on your lower back and leg muscles  Ask your healthcare provider for more information about assistive devices and how to use them correctly  Self-care:   · Avoid pressure on your back and legs:  Do not  lift heavy objects, or stand or sit for long periods of time  · Lift objects safely:  Keep your back straight and bend your knees when you  an object  Do not bend or twist your back when you lift  · Maintain a healthy weight:  Ask your healthcare provider how much you should weigh  Ask him to help you create a weight loss plan if you are overweight  · Exercise:  Ask your healthcare provider about the best stretching, warmup, and exercise plan for you  Contact your healthcare provider if:   · You have pain in your lower back at night or when resting  · You have pain in your lower back with numbness below the knee  · You have weakness in one leg only  · You have questions or concerns about your condition or care    Return to the emergency department if:   · You have trouble holding back your urine or bowel movements  · You have weakness in both legs  · You have numbness in your groin or buttocks  © 2017 [facility] [place] Information is for End User's use only and may not be sold, redistributed or otherwise used for commercial purposes  All illustrations and images included in CareNotes® are the copyrighted property of [employer]  or J[employer]  The above information is an  only  It is not intended as medical advice for individual conditions or treatments  Talk to your doctor, nurse or pharmacist before following any medical regimen to see if it is safe and effective for you  Lower Back Exercises   WHAT YOU NEED TO KNOW:   Lower back exercises help heal and strengthen your back muscles to prevent another injury  Ask your healthcare provider if you need to see a physical therapist for more advanced exercises  DISCHARGE INSTRUCTIONS:   Return to the emergency department if:   · You have severe pain that prevents you from moving  Contact your healthcare provider if:   · Your pain becomes worse  · You have new pain  · You have questions or concerns about your condition or care  Do lower back exercises safely:   · Do the exercises on a mat or firm surface  (not on a bed) to support your spine and prevent low back pain  · Move slowly and smoothly  Avoid fast or jerky motions  · Breathe normally  Do not hold your breath  · Stop if you feel pain  It is normal to feel some discomfort at first  Regular exercise will help decrease your discomfort over time  Lower back exercises: Your healthcare provider may recommend that you do back exercises 10 to 30 minutes each day  He may also recommend that you do exercises 1 to 3 times each day  Ask your healthcare provider which exercises are best for you and how often to do them  · Ankle pumps:  Lie on your back   Move your foot up (with your toes pointing toward your head)  Then, move your foot down (with your toes pointing away from you)  Repeat this exercise 10 times on each side  · Heel slides:  Lie on your back  Slowly bend one leg and then straighten it  Next, bend the other leg and then straighten it  Repeat 10 times on each side  · Pelvic tilt:  Lie on your back with your knees bent and feet flat on the floor  Place your arms in a relaxed position beside your body  Tighten the muscles of your abdomen and flatten your back against the floor  Hold for 5 seconds  Repeat 5 times  · Back stretch:  Lie on your back with your hands behind your head  Bend your knees and turn the lower half of your body to one side  Hold this position for 10 seconds  Repeat 3 times on each side  · Straight leg raises:  Lie on your back with one leg straight  Bend the other knee  Tighten your abdomen and then slowly lift the straight leg up about 6 to 12 inches off the floor  Hold for 1 to 5 seconds  Lower your leg slowly  Repeat 10 times on each leg  · Knee-to-chest:  Lie on your back with your knees bent and feet flat on the floor  Pull one of your knees toward your chest and hold it there for 5 seconds  Return your leg to the starting position  Lift the other knee toward your chest and hold for 5 seconds  Do this 5 times on each side  · Cat and camel:  Place your hands and knees on the floor  Arch your back upward toward the ceiling and lower your head  Round out your spine as much as you can  Hold for 5 seconds  Lift your head upward and push your chest downward toward the floor  Hold for 5 seconds  Do 3 sets or as directed  · Wall squats:  Stand with your back against a wall  Tighten the muscles of your abdomen  Slowly lower your body until your knees are bent at a 45 degree angle  Hold this position for 5 seconds  Slowly move back up to a standing position  Repeat 10 times             · Curl up:  Lie on your back with your knees bent and feet flat on the floor  Place your hands, palms down, underneath the curve in your lower back  Next, with your elbows on the floor, lift your shoulders and chest 2 to 3 inches  Keep your head in line with your shoulders  Hold this position for 5 seconds  When you can do this exercise without pain for 10 to 15 seconds, you may add a rotation  While your shoulders and chest are lifted off the ground, turn slightly to the left and hold  Repeat on the other side  · Bird dog:  Place your hands and knees on the floor  Keep your wrists directly below your shoulders and your knees directly below your hips  Pull your belly button in toward your spine  Do not flatten or arch your back  Tighten your abdominal muscles  Raise one arm straight out so that it is aligned with your head  Next, raise the leg opposite your arm  Hold this position for 15 seconds  Lower your arm and leg slowly and change sides  Do 5 sets  © 2017 [facility] Information is for End User's use only and may not be sold, redistributed or otherwise used for commercial purposes  All illustrations and images included in CareNotes® are the copyrighted property of [employer]  or Jm Moore  The above information is an  only  It is not intended as medical advice for individual conditions or treatments  Talk to your doctor, nurse or pharmacist before following any medical regimen to see if it is safe and effective for you
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
## 2018-07-09 ENCOUNTER — TELEPHONE (OUTPATIENT)
Dept: PHYSICAL THERAPY | Facility: OTHER | Age: 48
End: 2018-07-09

## 2018-07-10 ENCOUNTER — TELEPHONE (OUTPATIENT)
Dept: PHYSICAL THERAPY | Facility: OTHER | Age: 48
End: 2018-07-10

## 2018-07-11 ENCOUNTER — TELEPHONE (OUTPATIENT)
Dept: PHYSICAL THERAPY | Facility: OTHER | Age: 48
End: 2018-07-11

## 2018-07-11 NOTE — TELEPHONE ENCOUNTER
Unable to reach patient  Triage RN has made multiple attempts to contact patient on both home and mobile numbers

## 2018-09-05 ENCOUNTER — HOSPITAL ENCOUNTER (INPATIENT)
Facility: HOSPITAL | Age: 48
LOS: 6 days | Discharge: HOME | DRG: 885 | End: 2018-09-11
Attending: PSYCHIATRY & NEUROLOGY | Admitting: PSYCHIATRY & NEUROLOGY
Payer: COMMERCIAL

## 2018-09-05 ENCOUNTER — HOSPITAL ENCOUNTER (EMERGENCY)
Facility: HOSPITAL | Age: 48
End: 2018-09-05
Attending: EMERGENCY MEDICINE
Payer: COMMERCIAL

## 2018-09-05 VITALS
WEIGHT: 228.62 LBS | SYSTOLIC BLOOD PRESSURE: 116 MMHG | OXYGEN SATURATION: 98 % | DIASTOLIC BLOOD PRESSURE: 80 MMHG | TEMPERATURE: 98.1 F | BODY MASS INDEX: 36.9 KG/M2 | HEART RATE: 80 BPM | RESPIRATION RATE: 16 BRPM

## 2018-09-05 DIAGNOSIS — G47.00 INSOMNIA: Primary | ICD-10-CM

## 2018-09-05 DIAGNOSIS — F31.4 BIPOLAR 1 DISORDER, DEPRESSED, SEVERE (HCC): Primary | ICD-10-CM

## 2018-09-05 DIAGNOSIS — F31.4 BIPOLAR 1 DISORDER, DEPRESSED, SEVERE (HCC): ICD-10-CM

## 2018-09-05 DIAGNOSIS — F43.10 PTSD (POST-TRAUMATIC STRESS DISORDER): ICD-10-CM

## 2018-09-05 LAB
AMPHETAMINES SERPL QL SCN: NEGATIVE
BARBITURATES UR QL: NEGATIVE
BENZODIAZ UR QL: NEGATIVE
COCAINE UR QL: NEGATIVE
ETHANOL EXG-MCNC: 0 MG/DL
EXT PREG TEST URINE: NEGATIVE
METHADONE UR QL: NEGATIVE
OPIATES UR QL SCN: NEGATIVE
PCP UR QL: NEGATIVE
THC UR QL: NEGATIVE

## 2018-09-05 PROCEDURE — 99285 EMERGENCY DEPT VISIT HI MDM: CPT

## 2018-09-05 PROCEDURE — 81025 URINE PREGNANCY TEST: CPT | Performed by: EMERGENCY MEDICINE

## 2018-09-05 PROCEDURE — 82075 ASSAY OF BREATH ETHANOL: CPT | Performed by: EMERGENCY MEDICINE

## 2018-09-05 PROCEDURE — 80307 DRUG TEST PRSMV CHEM ANLYZR: CPT | Performed by: EMERGENCY MEDICINE

## 2018-09-05 RX ORDER — MAGNESIUM HYDROXIDE/ALUMINUM HYDROXICE/SIMETHICONE 120; 1200; 1200 MG/30ML; MG/30ML; MG/30ML
30 SUSPENSION ORAL EVERY 4 HOURS PRN
Status: CANCELLED | OUTPATIENT
Start: 2018-09-05

## 2018-09-05 RX ORDER — TRAZODONE HYDROCHLORIDE 50 MG/1
100 TABLET ORAL ONCE
Status: COMPLETED | OUTPATIENT
Start: 2018-09-05 | End: 2018-09-05

## 2018-09-05 RX ORDER — BENZTROPINE MESYLATE 1 MG/ML
1 INJECTION INTRAMUSCULAR; INTRAVENOUS EVERY 6 HOURS PRN
Status: CANCELLED | OUTPATIENT
Start: 2018-09-05

## 2018-09-05 RX ORDER — LORAZEPAM 2 MG/ML
2 INJECTION INTRAMUSCULAR EVERY 6 HOURS PRN
Status: CANCELLED | OUTPATIENT
Start: 2018-09-05

## 2018-09-05 RX ORDER — ZIPRASIDONE HYDROCHLORIDE 20 MG/1
20 CAPSULE ORAL 2 TIMES DAILY WITH MEALS
Status: CANCELLED | OUTPATIENT
Start: 2018-09-05

## 2018-09-05 RX ORDER — OLANZAPINE 10 MG/1
10 INJECTION, POWDER, LYOPHILIZED, FOR SOLUTION INTRAMUSCULAR
Status: CANCELLED | OUTPATIENT
Start: 2018-09-05

## 2018-09-05 RX ORDER — HALOPERIDOL 5 MG
5 TABLET ORAL EVERY 6 HOURS PRN
Status: CANCELLED | OUTPATIENT
Start: 2018-09-05

## 2018-09-05 RX ORDER — ACETAMINOPHEN 325 MG/1
650 TABLET ORAL EVERY 6 HOURS PRN
Status: CANCELLED | OUTPATIENT
Start: 2018-09-05

## 2018-09-05 RX ORDER — TRAZODONE HYDROCHLORIDE 50 MG/1
50 TABLET ORAL
Status: CANCELLED | OUTPATIENT
Start: 2018-09-05

## 2018-09-05 RX ORDER — BENZTROPINE MESYLATE 1 MG/1
1 TABLET ORAL EVERY 6 HOURS PRN
Status: CANCELLED | OUTPATIENT
Start: 2018-09-05

## 2018-09-05 RX ORDER — RISPERIDONE 1 MG/1
1 TABLET, ORALLY DISINTEGRATING ORAL
Status: CANCELLED | OUTPATIENT
Start: 2018-09-05

## 2018-09-05 RX ORDER — LORAZEPAM 1 MG/1
1 TABLET ORAL EVERY 6 HOURS PRN
Status: CANCELLED | OUTPATIENT
Start: 2018-09-05

## 2018-09-05 RX ORDER — OLANZAPINE 2.5 MG/1
10 TABLET ORAL
Status: CANCELLED | OUTPATIENT
Start: 2018-09-05

## 2018-09-05 RX ORDER — HALOPERIDOL 5 MG/ML
5 INJECTION INTRAMUSCULAR EVERY 6 HOURS PRN
Status: CANCELLED | OUTPATIENT
Start: 2018-09-05

## 2018-09-05 RX ORDER — PANTOPRAZOLE SODIUM 40 MG/1
40 TABLET, DELAYED RELEASE ORAL
Status: CANCELLED | OUTPATIENT
Start: 2018-09-06

## 2018-09-05 RX ADMIN — TRAZODONE HYDROCHLORIDE 100 MG: 50 TABLET ORAL at 01:41

## 2018-09-05 RX ADMIN — TRAZODONE HYDROCHLORIDE 100 MG: 50 TABLET ORAL at 20:33

## 2018-09-05 NOTE — ED NOTES
CW spoke with Yasmin Najera who will be picking pt up at 21:00 and transporting to [facility]  CW informed nurse Casey Baxter of same

## 2018-09-05 NOTE — ED NOTES
Took over from May LAZARO Ludwig patient calm and cooperative, family at the bedside  Will continue to monitor        Pauly Madera  09/05/18 1997

## 2018-09-05 NOTE — ED NOTES
Call placed to:     [facility]: Per Ever Wu, they are full  [facility]: Per Andreablessing Gwinner, they are full  [facility] - Per Rangel Salmeron, they have a bed available  Clinical sent        Isaac Vilchis [facility]  18  [phone]
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
## 2018-09-05 NOTE — ED NOTES
Pt remains asleep at this time  Pt can be heard snoring  No signs of distress noted        Papi Haley  09/05/18 2015

## 2018-09-05 NOTE — ED NOTES
Pt finished eating and ambulated to the bathroom with staff  Pt is laying in bed watching tv        Donna Ochoa  09/05/18 1216

## 2018-09-05 NOTE — ED NOTES
Received report from FLASH Liao  Pt is laying in bed resting watching tv  Pt is calm and cooperative  No signs of distress noted at this time   Will continue to monitor      Julian Arvizu  09/05/18 4893

## 2018-09-05 NOTE — ED NOTES
Pts daughter and daughters boyfriend at bedside  Pt laying in bed talking with family  No signs of distress at this time   Will continue to monitor       Fidelina Machado  09/05/18 3128

## 2018-09-05 NOTE — ED NOTES
Pt woke up and asked for Crisis Worker again  Pt is becoming impatient and agitated at having to wait   Pt repeatedly states, "I have never had to wait this long to be seen by someone "      Donna Ochoa  09/05/18 7239

## 2018-09-05 NOTE — ED NOTES
Patient calm and cooperative, family still at the bedside  Will continue to monitor        Halle Dejesus  09/05/18 3536

## 2018-09-05 NOTE — ED NOTES
Pt is laying in bed watching tv  No signs of distress noted at this time        Eunice Danial  09/05/18 7524

## 2018-09-05 NOTE — ED NOTES
Pt was given phone to make a call      [facility]  09/05/18 [phone]

## 2018-09-05 NOTE — ED NOTES
Food tray ordered  Pt sitting in bed talking with family  No signs of distress at this time   Will continue to monitor      Emery Ballard  09/05/18 0222

## 2018-09-05 NOTE — LETTER
[address] [address]  Dept: [phone]                                                                 EMTALA TRANSFER CONSENT    NAME Gale Wheat                              1970                              MRN [id number]    I have been informed of my rights regarding examination, treatment, and transfer   by Dr Scott La, *    Benefits: Other benefits (Include comment)_______________________    Risks: Potential deterioration of medical condition    Consent for Transfer:  I acknowledge that my medical condition has been evaluated and explained to me by the emergency department physician or other qualified medical person and/or my attending physician, who has recommended that I be transferred to the service of  Accepting Physician: Dr Rachel Hoffman at [facility] Name, [address] : [facility] 2[facility]  The above potential benefits of such transfer, the potential risks associated with such transfer, and the probable risks of not being transferred have been explained to me, and I fully understand them  The doctor has explained that, in my case, the benefits of transfer outweigh the risks  I agree to be transferred  I authorize the performance of emergency medical procedures and treatments upon me in both transit and upon arrival at the receiving facility  Additionally, I authorize the release of any and all medical records to the receiving facility and request they be transported with me, if possible  I understand that the safest mode of transportation during a medical emergency is an ambulance and that the Hospital advocates the use of this mode of transport  Risks of traveling to the receiving facility by car, including absence of medical control, life sustaining equipment, such as oxygen, and medical personnel has been explained to me and I fully understand them      (RICHARD CORRECT BOX BELOW)  [X]  I consent to the stated transfer and to be transported by ambulance/helicopter  [  ]  I consent to the stated transfer, but refuse transportation by ambulance and accept full responsibility for my transportation by car  I understand the risks of non-ambulance transfers and I exonerate the Hospital and its staff from any deterioration in my condition that results from this refusal     X___________________________________________    DATE  18  TIME________  Signature of patient or legally responsible individual signing on patient behalf           RELATIONSHIP TO PATIENT_________________________                          Provider Certification    NAME Afshin Steele                           1970                              MRN [id number]    A medical screening exam was performed on the above named patient  Based on the examination:    Condition Necessitating Transfer The encounter diagnosis was Bipolar 1 disorder, depressed, severe ([address] )  Patient Condition: The patient has been stabilized such that within reasonable medical probability, no material deterioration of the patient condition or the condition of the unborn child(sonja) is likely to result from the transfer    Reason for Transfer: Level of Care needed not available at this facility    Transfer Requirements: Facility [facility]   · Space available and qualified personnel available for treatment as acknowledged by MARCUS Ayon  · Agreed to accept transfer and to provide appropriate medical treatment as acknowledged by       Dr Marina Hammond  · Appropriate medical records of the examination and treatment of the patient are provided at the time of transfer   [address], [address] ___JG____  · Transfer will be performed by qualified personnel from ____________________________________  and appropriate transfer equipment as required, including the use of necessary and appropriate life support measures      Provider Certification: I have examined the patient and explained the following risks and benefits of being transferred/refusing transfer to the patient/family:  The patient is stable for psychiatric transfer because they are medically stable, and is protected from harming him/herself or others during transport      Based on these reasonable risks and benefits to the patient and/or the unborn child(sonja), and based upon the information available at the time of the patients examination, I certify that the medical benefits reasonably to be expected from the provision of appropriate medical treatments at another medical facility outweigh the increasing risks, if any, to the individuals medical condition, and in the case of labor to the unborn child, from effecting the transfer      X____________________________________________ DATE 09/05/18        TIME_______      ORIGINAL - SEND TO MEDICAL RECORDS   COPY - SEND WITH PATIENT DURING TRANSFER

## 2018-09-05 NOTE — ED NOTES
One to one observation initiated  Pt is asleep at this time  Breathing is even and unlabored  No signs of distress noted at this time        Freeman Agarwal  09/05/18 0789

## 2018-09-05 NOTE — ED NOTES
Pts  and brother at bedside    brought clothes and toiletries to bring with her to [facility]  09/05/18 0298

## 2018-09-05 NOTE — ED NOTES
Patient is accepted at [facility]  Patient is accepted by Dr Yefri Connor per  Chloe Ortiz in Crisis  Transportation is arranged with Sima Haynes at [facility]  Awaiting call back with availability  Evening CW to f/u  Nurse report is to be called to [phone] prior to patient transfer      Boris Crawford LMSW  09/05/18  5803

## 2018-09-05 NOTE — ED NOTES
Pt is a 51 y o  female who presented to the ED due to increased depression and anxiety, as well as suicidal ideation with a plan to overdose on meds or drive her car into a wall  Pt reports that she was at court yesterday due to her neighbor complaining about pt's dog going in her yard  Pt adds that they used to be friends, but now she causes a lot of issues  Pt stated that the neighbor tells people that she is a drug dealer and records conversations when they have cookouts  Pt stated that she has been off of her meds for many months and that her mood swings have increased significantly  Pt also c/o poor sleep and appetite, adding that she is currently menopausal and that is also "messing her up"  Discussed options with the pt and she is open to signing in at this time  No AH/VH and no HI  Chief Complaint   Patient presents with    Psychiatric Evaluation     pt was diagnosed with bipolar 13 years ago  pt states [facility], but pt states was unable to get thorough to anyone to make an appt and has not taken bipolar medication for 2 months  pt states " I need help"     Intake Assessment completed, Safety Risk Assessment completed      Nzaario Mendoza LMSW  09/05/18  1200

## 2018-09-05 NOTE — ED NOTES
Pt woke up to allow vitals to be taken, and asked when the Crisis Worker would be in to see her  Pt fell asleep again  Breathing is even and unlabored  No signs of distress noted at this time        Infante   09/05/18 3941

## 2018-09-05 NOTE — ED NOTES
Pt is laying in bed  Pt is calm and cooperative  No signs of distress noted at this time   Will continue to monitor      Marcie Genera  09/05/18 8903

## 2018-09-05 NOTE — ED NOTES
Pt resting  When checked  1:1 switched out presently    Close obs continues by Ann Arnett RN  09/05/18 2309

## 2018-09-06 LAB
ALBUMIN SERPL BCP-MCNC: 2.9 G/DL (ref 3.5–5)
ALP SERPL-CCNC: 94 U/L (ref 46–116)
ALT SERPL W P-5'-P-CCNC: 30 U/L (ref 12–78)
ANION GAP SERPL CALCULATED.3IONS-SCNC: 5 MMOL/L (ref 4–13)
AST SERPL W P-5'-P-CCNC: 18 U/L (ref 5–45)
BASOPHILS # BLD AUTO: 0.03 THOUSANDS/ΜL (ref 0–0.1)
BASOPHILS NFR BLD AUTO: 0 % (ref 0–1)
BILIRUB SERPL-MCNC: 0.2 MG/DL (ref 0.2–1)
BILIRUB UR QL STRIP: NEGATIVE
BUN SERPL-MCNC: 24 MG/DL (ref 5–25)
CALCIUM SERPL-MCNC: 8.8 MG/DL (ref 8.3–10.1)
CHLORIDE SERPL-SCNC: 105 MMOL/L (ref 100–108)
CHOLEST SERPL-MCNC: 169 MG/DL (ref 50–200)
CLARITY UR: CLEAR
CO2 SERPL-SCNC: 33 MMOL/L (ref 21–32)
COLOR UR: YELLOW
CREAT SERPL-MCNC: 0.99 MG/DL (ref 0.6–1.3)
EOSINOPHIL # BLD AUTO: 0.14 THOUSAND/ΜL (ref 0–0.61)
EOSINOPHIL NFR BLD AUTO: 2 % (ref 0–6)
ERYTHROCYTE [DISTWIDTH] IN BLOOD BY AUTOMATED COUNT: 13 % (ref 11.6–15.1)
EST. AVERAGE GLUCOSE BLD GHB EST-MCNC: 114 MG/DL
GFR SERPL CREATININE-BSD FRML MDRD: 67 ML/MIN/1.73SQ M
GLUCOSE SERPL-MCNC: 105 MG/DL (ref 65–140)
GLUCOSE UR STRIP-MCNC: NEGATIVE MG/DL
HBA1C MFR BLD: 5.6 % (ref 4.2–6.3)
HCG SERPL QL: NEGATIVE
HCT VFR BLD AUTO: 37.1 % (ref 34.8–46.1)
HDLC SERPL-MCNC: 48 MG/DL (ref 40–60)
HGB BLD-MCNC: 12.2 G/DL (ref 11.5–15.4)
HGB UR QL STRIP.AUTO: NEGATIVE
IMM GRANULOCYTES # BLD AUTO: 0.02 THOUSAND/UL (ref 0–0.2)
IMM GRANULOCYTES NFR BLD AUTO: 0 % (ref 0–2)
KETONES UR STRIP-MCNC: NEGATIVE MG/DL
LDLC SERPL CALC-MCNC: 103 MG/DL (ref 0–100)
LEUKOCYTE ESTERASE UR QL STRIP: NEGATIVE
LYMPHOCYTES # BLD AUTO: 2.62 THOUSANDS/ΜL (ref 0.6–4.47)
LYMPHOCYTES NFR BLD AUTO: 38 % (ref 14–44)
MCH RBC QN AUTO: 29.8 PG (ref 26.8–34.3)
MCHC RBC AUTO-ENTMCNC: 32.9 G/DL (ref 31.4–37.4)
MCV RBC AUTO: 91 FL (ref 82–98)
MONOCYTES # BLD AUTO: 0.58 THOUSAND/ΜL (ref 0.17–1.22)
MONOCYTES NFR BLD AUTO: 9 % (ref 4–12)
NEUTROPHILS # BLD AUTO: 3.47 THOUSANDS/ΜL (ref 1.85–7.62)
NEUTS SEG NFR BLD AUTO: 51 % (ref 43–75)
NITRITE UR QL STRIP: NEGATIVE
NONHDLC SERPL-MCNC: 121 MG/DL
NRBC BLD AUTO-RTO: 0 /100 WBCS
PH UR STRIP.AUTO: 5.5 [PH] (ref 4.5–8)
PLATELET # BLD AUTO: 304 THOUSANDS/UL (ref 149–390)
PMV BLD AUTO: 9.7 FL (ref 8.9–12.7)
POTASSIUM SERPL-SCNC: 3.9 MMOL/L (ref 3.5–5.3)
PROT SERPL-MCNC: 6.9 G/DL (ref 6.4–8.2)
PROT UR STRIP-MCNC: NEGATIVE MG/DL
RBC # BLD AUTO: 4.1 MILLION/UL (ref 3.81–5.12)
RPR SER QL: NORMAL
SODIUM SERPL-SCNC: 143 MMOL/L (ref 136–145)
SP GR UR STRIP.AUTO: >=1.03 (ref 1–1.03)
TRIGL SERPL-MCNC: 88 MG/DL
TSH SERPL DL<=0.05 MIU/L-ACNC: 0.48 UIU/ML (ref 0.36–3.74)
UROBILINOGEN UR QL STRIP.AUTO: 0.2 E.U./DL
WBC # BLD AUTO: 6.86 THOUSAND/UL (ref 4.31–10.16)

## 2018-09-06 PROCEDURE — 81003 URINALYSIS AUTO W/O SCOPE: CPT | Performed by: PHYSICIAN ASSISTANT

## 2018-09-06 PROCEDURE — 99231 SBSQ HOSP IP/OBS SF/LOW 25: CPT | Performed by: PHYSICIAN ASSISTANT

## 2018-09-06 PROCEDURE — 85025 COMPLETE CBC W/AUTO DIFF WBC: CPT | Performed by: PHYSICIAN ASSISTANT

## 2018-09-06 PROCEDURE — 80061 LIPID PANEL: CPT | Performed by: PHYSICIAN ASSISTANT

## 2018-09-06 PROCEDURE — 84703 CHORIONIC GONADOTROPIN ASSAY: CPT | Performed by: PHYSICIAN ASSISTANT

## 2018-09-06 PROCEDURE — 80053 COMPREHEN METABOLIC PANEL: CPT | Performed by: PHYSICIAN ASSISTANT

## 2018-09-06 PROCEDURE — 83036 HEMOGLOBIN GLYCOSYLATED A1C: CPT | Performed by: PHYSICIAN ASSISTANT

## 2018-09-06 PROCEDURE — 84443 ASSAY THYROID STIM HORMONE: CPT | Performed by: PHYSICIAN ASSISTANT

## 2018-09-06 PROCEDURE — 99223 1ST HOSP IP/OBS HIGH 75: CPT | Performed by: PSYCHIATRY & NEUROLOGY

## 2018-09-06 PROCEDURE — 86592 SYPHILIS TEST NON-TREP QUAL: CPT | Performed by: PHYSICIAN ASSISTANT

## 2018-09-06 RX ORDER — OLANZAPINE 10 MG/1
10 INJECTION, POWDER, LYOPHILIZED, FOR SOLUTION INTRAMUSCULAR
Status: DISCONTINUED | OUTPATIENT
Start: 2018-09-06 | End: 2018-09-11 | Stop reason: HOSPADM

## 2018-09-06 RX ORDER — PANTOPRAZOLE SODIUM 40 MG/1
40 TABLET, DELAYED RELEASE ORAL
Status: DISCONTINUED | OUTPATIENT
Start: 2018-09-06 | End: 2018-09-11 | Stop reason: HOSPADM

## 2018-09-06 RX ORDER — CLONIDINE HYDROCHLORIDE 0.1 MG/1
0.2 TABLET ORAL
Status: DISCONTINUED | OUTPATIENT
Start: 2018-09-06 | End: 2018-09-11 | Stop reason: HOSPADM

## 2018-09-06 RX ORDER — TRAZODONE HYDROCHLORIDE 100 MG/1
100 TABLET ORAL
Status: DISCONTINUED | OUTPATIENT
Start: 2018-09-06 | End: 2018-09-11 | Stop reason: HOSPADM

## 2018-09-06 RX ORDER — LORAZEPAM 1 MG/1
1 TABLET ORAL EVERY 6 HOURS PRN
Status: DISCONTINUED | OUTPATIENT
Start: 2018-09-06 | End: 2018-09-11 | Stop reason: HOSPADM

## 2018-09-06 RX ORDER — MAGNESIUM HYDROXIDE/ALUMINUM HYDROXICE/SIMETHICONE 120; 1200; 1200 MG/30ML; MG/30ML; MG/30ML
30 SUSPENSION ORAL EVERY 4 HOURS PRN
Status: DISCONTINUED | OUTPATIENT
Start: 2018-09-06 | End: 2018-09-11 | Stop reason: HOSPADM

## 2018-09-06 RX ORDER — HALOPERIDOL 5 MG
5 TABLET ORAL EVERY 6 HOURS PRN
Status: DISCONTINUED | OUTPATIENT
Start: 2018-09-06 | End: 2018-09-11 | Stop reason: HOSPADM

## 2018-09-06 RX ORDER — HALOPERIDOL 5 MG/ML
5 INJECTION INTRAMUSCULAR EVERY 6 HOURS PRN
Status: DISCONTINUED | OUTPATIENT
Start: 2018-09-06 | End: 2018-09-11 | Stop reason: HOSPADM

## 2018-09-06 RX ORDER — PANTOPRAZOLE SODIUM 40 MG/1
40 TABLET, DELAYED RELEASE ORAL
Status: DISCONTINUED | OUTPATIENT
Start: 2018-09-06 | End: 2018-09-06

## 2018-09-06 RX ORDER — ZIPRASIDONE HYDROCHLORIDE 20 MG/1
20 CAPSULE ORAL 2 TIMES DAILY WITH MEALS
Status: DISCONTINUED | OUTPATIENT
Start: 2018-09-06 | End: 2018-09-06

## 2018-09-06 RX ORDER — BENZTROPINE MESYLATE 1 MG/ML
1 INJECTION INTRAMUSCULAR; INTRAVENOUS EVERY 6 HOURS PRN
Status: DISCONTINUED | OUTPATIENT
Start: 2018-09-06 | End: 2018-09-11 | Stop reason: HOSPADM

## 2018-09-06 RX ORDER — BENZTROPINE MESYLATE 1 MG/1
1 TABLET ORAL EVERY 6 HOURS PRN
Status: DISCONTINUED | OUTPATIENT
Start: 2018-09-06 | End: 2018-09-11 | Stop reason: HOSPADM

## 2018-09-06 RX ORDER — ACETAMINOPHEN 325 MG/1
650 TABLET ORAL EVERY 6 HOURS PRN
Status: DISCONTINUED | OUTPATIENT
Start: 2018-09-06 | End: 2018-09-11 | Stop reason: HOSPADM

## 2018-09-06 RX ORDER — TRAZODONE HYDROCHLORIDE 50 MG/1
50 TABLET ORAL
Status: DISCONTINUED | OUTPATIENT
Start: 2018-09-06 | End: 2018-09-11 | Stop reason: HOSPADM

## 2018-09-06 RX ORDER — RISPERIDONE 1 MG/1
1 TABLET, ORALLY DISINTEGRATING ORAL
Status: DISCONTINUED | OUTPATIENT
Start: 2018-09-06 | End: 2018-09-11 | Stop reason: HOSPADM

## 2018-09-06 RX ORDER — LITHIUM CARBONATE 300 MG/1
600 CAPSULE ORAL
Status: DISCONTINUED | OUTPATIENT
Start: 2018-09-06 | End: 2018-09-11 | Stop reason: HOSPADM

## 2018-09-06 RX ORDER — LORAZEPAM 2 MG/ML
2 INJECTION INTRAMUSCULAR EVERY 6 HOURS PRN
Status: DISCONTINUED | OUTPATIENT
Start: 2018-09-06 | End: 2018-09-11 | Stop reason: HOSPADM

## 2018-09-06 RX ORDER — OLANZAPINE 10 MG/1
10 TABLET ORAL
Status: DISCONTINUED | OUTPATIENT
Start: 2018-09-06 | End: 2018-09-11 | Stop reason: HOSPADM

## 2018-09-06 RX ADMIN — CLONIDINE HYDROCHLORIDE 0.2 MG: 0.1 TABLET ORAL at 21:06

## 2018-09-06 RX ADMIN — PANTOPRAZOLE SODIUM 40 MG: 40 TABLET, DELAYED RELEASE ORAL at 06:24

## 2018-09-06 RX ADMIN — LITHIUM CARBONATE 600 MG: 300 CAPSULE, GELATIN COATED ORAL at 17:04

## 2018-09-06 RX ADMIN — PANTOPRAZOLE SODIUM 40 MG: 40 TABLET, DELAYED RELEASE ORAL at 17:03

## 2018-09-06 RX ADMIN — TRAZODONE HYDROCHLORIDE 100 MG: 100 TABLET ORAL at 21:06

## 2018-09-06 NOTE — TREATMENT PLAN
TREATMENT PLAN REVIEW - [facility] 51 y o  1970 female MRN: [id number]    [address] [place] [address] Room / Bed: Elle Baker/[facility] 128-70 Encounter: [id number]          Admit Date/Time:  9/5/2018 10:09 PM    Treatment Team: Attending Provider: Zeke Finn; Consulting Physician: Denis Covarrubias MD; Registered Nurse: Liset Camarena, RN; Patient Care Technician: Sydnee Meadows; Patient Care Assistant: Rafiq Pham; Registered Nurse: Vivian Montoya, RN; Registered Nurse: Diana Vargas, RN; Occupational Therapy Assistant: CARMELO Alonzo; Patient Care Technician: Jessica Perez; : Donte Peña    Diagnosis: Principal Problem:    Bipolar 1 disorder, depressed, severe ([facility] [address] )  Active Problems:    PTSD (post-traumatic stress disorder)    Patient Strengths/Assets: cooperative, good past treatment response, motivation for treatment/growth, patient is on a voluntary commitment    Patient Barriers/Limitations: low self esteem, noncompliant with medication    Short Term Goals: decrease in depressive symptoms, decrease in anxiety symptoms, decrease in suicidal thoughts, decrease in self abusive behaviors    Long Term Goals: improvement in depression, improvement in anxiety, stabilization of mood, free of suicidal thoughts, acceptance of need for psychiatric medications, acceptance of need for psychiatric treatment, acceptance of need for psychiatric follow up after discharge    Progress Towards Goals: starting psychiatric medications as prescribed    Recommended Treatment: medication management, patient medication education, group therapy, milieu therapy, continued Behavioral Health psychiatric evaluation/assessment process    Treatment Frequency: daily medication monitoring, group and milieu therapy daily, monitoring through interdisciplinary rounds, monitoring through weekly patient care conferences    Expected Discharge Date: 5-7 days    Discharge Plan: referral for outpatient medication management with a psychiatrist, referral for outpatient psychotherapy    Treatment Plan Created/Updated By: Lisa Dixon

## 2018-09-06 NOTE — ED NOTES
Patient given medication by RN, family members left  Patient calm and cooperative, will continue to monitor        Philippe Amaral  09/05/18 2035

## 2018-09-06 NOTE — PLAN OF CARE
Problem: SLEEP DISTURBANCE  Goal: Will exhibit normal sleeping pattern  Interventions:  -  Assess the patients sleep pattern, noting recent changes  - Administer medication as ordered  - Decrease environmental stimuli, including noise, as appropriate during the night  - Encourage the patient to actively participate in unit groups and or exercise during the day to enhance ability to achieve adequate sleep at night  - Assess the patient, in the morning, encouraging a description of sleep experience   Outcome: Progressing      Problem: DISCHARGE PLANNING  Goal: Discharge to home or other facility with appropriate resources  INTERVENTIONS:  - Identify barriers to discharge w/patient and caregiver  - Arrange for needed discharge resources and transportation as appropriate  - Identify discharge learning needs (meds, wound care, etc )  - Arrange for interpretive services to assist at discharge as needed  - Refer to Case Management Department for coordinating discharge planning if the patient needs post-hospital services based on physician/advanced practitioner order or complex needs related to functional status, cognitive ability, or social support system   Outcome: Progressing      Problem: Ineffective Coping  Goal: Cooperates with admission process  Interventions:   - Complete admission process   Outcome: Progressing    Goal: Identifies ineffective coping skills  Outcome: Progressing    Goal: Identifies healthy coping skills  Outcome: Progressing    Goal: Demonstrates healthy coping skills  Outcome: Progressing    Goal: Participates in unit activities  Interventions:  - Provide therapeutic environment   - Provide required programming   - Redirect inappropriate behaviors    Outcome: Progressing
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
## 2018-09-06 NOTE — PROGRESS NOTES
Awake and alert  Denies SI/HI  Discussed medication changes  Reported poor sleep prior to admit  Patient aware she needs to have spouse bring in linzesse from home
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
## 2018-09-06 NOTE — ED NOTES
Report called to Ruthy Augustine in [place], [address] [place] [address]  09/05/18 2055

## 2018-09-07 PROCEDURE — 99232 SBSQ HOSP IP/OBS MODERATE 35: CPT | Performed by: PSYCHIATRY & NEUROLOGY

## 2018-09-07 RX ADMIN — PANTOPRAZOLE SODIUM 40 MG: 40 TABLET, DELAYED RELEASE ORAL at 06:42

## 2018-09-07 RX ADMIN — CLONIDINE HYDROCHLORIDE 0.2 MG: 0.1 TABLET ORAL at 21:16

## 2018-09-07 RX ADMIN — LITHIUM CARBONATE 600 MG: 300 CAPSULE, GELATIN COATED ORAL at 17:16

## 2018-09-07 RX ADMIN — TRAZODONE HYDROCHLORIDE 100 MG: 100 TABLET ORAL at 21:16

## 2018-09-07 RX ADMIN — PANTOPRAZOLE SODIUM 40 MG: 40 TABLET, DELAYED RELEASE ORAL at 16:02
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
## 2018-09-07 NOTE — PROGRESS NOTES
Progress Note - [address] 51 y o  female MRN: [id number]  Unit/Bed#: Eddie Jean 167-25 Encounter: [id number]    Assessment/Plan   Principal Problem:    Bipolar 1 disorder, depressed, severe ([address] )  Active Problems:    PTSD (post-traumatic stress disorder)      Subjective:   Patient is compliant with recent addition of lithium with no acute side effects  Patient reports feeling positive about reintroduction of lithium  She does report slow improvement in mood and reduction and passive death wishes  She agreed with plan of checking lithium level over the weekend and planning titration if levels are not therapeutic  Patient was encouraged to participate in milieu therapy  She is consenting for safety on the unit      Current Medications:    Current Facility-Administered Medications:  acetaminophen 650 mg Oral Q6H PRN Viona Prom, PA-C   aluminum-magnesium hydroxide-simethicone 30 mL Oral Q4H PRN Viona Prom, PA-C   benztropine 1 mg Intramuscular Q6H PRN Viona Prom, PA-C   benztropine 1 mg Oral Q6H PRN Viona Prom, PA-C   cloNIDine 0 2 mg Oral HS [facility]   haloperidol 5 mg Oral Q6H PRN Viona Prom, PA-C   haloperidol lactate 5 mg Intramuscular Q6H PRN Viona Prom, PA-C   Linaclotide 1 capsule Oral Daily [facility]   lithium carbonate 600 mg Oral After Dinner [facility]   LORazepam 2 mg Intramuscular Q6H PRN Viona Prom, PA-C   LORazepam 1 mg Oral Q6H PRN Viona Prom, PA-C   magnesium hydroxide 30 mL Oral Daily PRN Viona Prom, PA-C   OLANZapine 10 mg Intramuscular Q3H PRN Viona Prom, PA-C   OLANZapine 10 mg Oral Q3H PRN Viona Prom, PA-C   pantoprazole 40 mg Oral BID AC Kaylin Kidd, PA-C   risperiDONE 1 mg Oral Q3H PRN Viona Prom, PA-C   traZODone 100 mg Oral HS [facility]   traZODone 50 mg Oral HS PRN Viona Prom, PA-C       Behavioral Health Medications: all current active meds have been reviewed  Vital signs in last 24 hours:  Temp:  [96 9 °F (36 1 °C)-97 8 °F (36 6 °C)] 97 1 °F (36 2 °C)  HR:  [84-97] 84  Resp:  [16-18] 18  BP: (111-161)/(57-90) 143/61    Laboratory results:    I have personally reviewed all pertinent laboratory/tests results    Labs in last 72 hours:   Recent Labs      09/06/18   0515   WBC  6 86   RBC  4 10   HGB  12 2   HCT  37 1   PLT  304   RDW  13 0   NEUTROABS  3 47   NA  143   K  3 9   CL  105   CO2  33*   BUN  24   CREATININE  0 99   GLUC  105   CALCIUM  8 8   AST  18   ALT  30   ALKPHOS  94   TP  6 9   ALB  2 9*   TBILI  0 20   CHOLESTEROL  169   HDL  48   TRIG  88   LDLCALC  103*   OCD4OPYLDYCA  0 483   PREGSERUM  Negative   RPR  Non-Reactive     Admission Labs:   Admission on 09/05/2018   Component Date Value    Color, UA 09/06/2018 Yellow     Clarity, UA 09/06/2018 Clear     Specific Gravity, UA 09/06/2018 >=1 030     pH, UA 09/06/2018 5 5     Leukocytes, UA 09/06/2018 Negative     Nitrite, UA 09/06/2018 Negative     Protein, UA 09/06/2018 Negative     Glucose, UA 09/06/2018 Negative     Ketones, UA 09/06/2018 Negative     Urobilinogen, UA 09/06/2018 0 2     Bilirubin, UA 09/06/2018 Negative     Blood, UA 09/06/2018 Negative     WBC 09/06/2018 6 86     RBC 09/06/2018 4 10     Hemoglobin 09/06/2018 12 2     Hematocrit 09/06/2018 37 1     MCV 09/06/2018 91     MCH 09/06/2018 29 8     MCHC 09/06/2018 32 9     RDW 09/06/2018 13 0     MPV 09/06/2018 9 7     Platelets 80/01/8430 304     nRBC 09/06/2018 0     Neutrophils Relative 09/06/2018 51     Immat GRANS % 09/06/2018 0     Lymphocytes Relative 09/06/2018 38     Monocytes Relative 09/06/2018 9     Eosinophils Relative 09/06/2018 2     Basophils Relative 09/06/2018 0     Neutrophils Absolute 09/06/2018 3 47     Immature Grans Absolute 09/06/2018 0 02     Lymphocytes Absolute 09/06/2018 2 62     Monocytes Absolute 09/06/2018 0 58     Eosinophils Absolute 09/06/2018 0 14     Basophils Absolute 09/06/2018 0 03     Sodium 09/06/2018 143     Potassium 09/06/2018 3 9     Chloride 09/06/2018 105     CO2 09/06/2018 33*    ANION GAP 09/06/2018 5     BUN 09/06/2018 24     Creatinine 09/06/2018 0 99     Glucose 09/06/2018 105     Calcium 09/06/2018 8 8     AST 09/06/2018 18     ALT 09/06/2018 30     Alkaline Phosphatase 09/06/2018 94     Total Protein 09/06/2018 6 9     Albumin 09/06/2018 2 9*    Total Bilirubin 09/06/2018 0 20     eGFR 09/06/2018 67     Preg, Serum 09/06/2018 Negative     Cholesterol 09/06/2018 169     Triglycerides 09/06/2018 88     HDL, Direct 09/06/2018 48     LDL Calculated 09/06/2018 103*    Non-HDL-Chol (CHOL-HDL) 09/06/2018 121     RPR 09/06/2018 Non-Reactive     Hemoglobin A1C 09/06/2018 5 6     EAG 09/06/2018 114     TSH 3RD GENERATON 09/06/2018 0 483        Psychiatric Review of Systems:  Behavior over the last 24 hours:  Slow improvement  Sleep: normal  Appetite: normal  Medication side effects: No  ROS: no complaints    Mental Status Evaluation:  Appearance:  casually dressed   Behavior:  guarded   Speech:  soft   Mood:  anxious and depressed   Affect:  constricted   Language naming objects and repeating phrases   Thought Process:  circumstantial   Thought Content:  delusions  grandiose and obsessions   Perceptual Disturbances: None   Risk Potential: Suicidal Ideations without plan, Homicidal Ideations none and Potential for Aggression No   Sensorium:  person, place and time/date   Cognition:  grossly intact   Consciousness:  awake    Attention: attention span appeared shorter than expected for age   Insight:  limited   Judgment: limited   Intellect fair   Gait/Station: normal gait/station   Motor Activity: no abnormal movements     Memory: Short and long term memory  fair     Progress Toward Goals: slow progress    Recommended Treatment:   Check lithium level on Sunday and plan titration accordingly    Continue with group therapy, milieu therapy and occupational therapy  Continue following current medications:   Current Facility-Administered Medications:  acetaminophen 650 mg Oral Q6H PRN Josef Townsend PA-C   aluminum-magnesium hydroxide-simethicone 30 mL Oral Q4H PRN Josef Townsend PA-C   benztropine 1 mg Intramuscular Q6H PRN Josef Townsend PA-C   benztropine 1 mg Oral Q6H PRN Josef Townsend PA-C   cloNIDine 0 2 mg Oral HS [facility]   haloperidol 5 mg Oral Q6H PRN Josef Townsend PA-C   haloperidol lactate 5 mg Intramuscular Q6H PRN Josef Townsend PA-C   Linaclotide 1 capsule Oral Daily [facility]   lithium carbonate 600 mg Oral After Dinner [facility]   LORazepam 2 mg Intramuscular Q6H PRN Josef Townsend PA-C   LORazepam 1 mg Oral Q6H PRN Josef Townsend PA-C   magnesium hydroxide 30 mL Oral Daily PRN Josef Townsend PA-C   OLANZapine 10 mg Intramuscular Q3H PRN Josef Townsend PA-C   OLANZapine 10 mg Oral Q3H PRN Josef Townsend PA-C   pantoprazole 40 mg Oral BID AC Kaylin Kidd PA-C   risperiDONE 1 mg Oral Q3H PRN Josef Townsend PA-C   traZODone 100 mg Oral HS [facility]   traZODone 50 mg Oral HS PRN Josef Townsend PA-C       Risks, benefits and possible side effects of Medications:   Risks, benefits, and possible side effects of medications explained to patient and patient verbalizes understanding  Risks of medications in pregnancy explained if female patient  Patient verbalizes understanding and agrees to notify her doctor if she becomes pregnant  This note has been constructed using a voice recognition system  There may be translation, syntax,  or grammatical errors  If you have any questions, please contact the dictating provider

## 2018-09-07 NOTE — PLAN OF CARE
Problem: Ineffective Coping  Goal: Cooperates with admission process  Interventions:   - Complete admission process   Outcome: Completed Date Met: 09/07/18

## 2018-09-07 NOTE — CASE MANAGEMENT
CW met with Pt to review the phone number she provided for Garrett Peraza  Pt confirmed the number but said that for some reason the phone bill had not automatically debited out of their account, so Garrett Peraza was going to pay it today  CW reviewed with Pt the phone calls to providers, & Pt said that she didn't really want to go to [facility] & remains focused on a therapist that specializes in trauma  Pt reports willingness to drive further if needed  CW reviewed that the only Medicare provider she knew of in the area, was not scheduling until the end of the year, & that Pt herself would have to call in December to get an appointment  Pt asked about [facility] in [place], but CW said she was unfamiliar & would have to look it up  CW contacted Pt's insurance [employer] & a representative agreed to email a list of providers

## 2018-09-07 NOTE — CASE MANAGEMENT
CW contacted Dr Padmini Rodriguez office @ [phone] & spoke with Deion Salinas who reported that Pt has an appointment on 9/17 at 4:40 PM   Chantel Ryan confirmed fax number [phone] to send discharge information  CW contacted [facility] @ [phone], however, they do not take Medicare  They suggested Ramila Ramirez @ [phone]  CW contacted [facility]od[facility]  @ [phone] and spoke with Andrew Hooks who said they are booked through the end of the year, & that Chantel Ryan would need to call back the beginning of December  CW contacted Dr January Tyler office @ [phone] but reached the answering service, who said that the office just closed at noon & would reopen on Monday at [address] contacted [facility] @ [phone] & left a message to see about referring Pt for services  CW contacted Pt's Yasmin rivera, @ [phone], however, reached a recording that the subscriber is not in service

## 2018-09-07 NOTE — PROGRESS NOTES
Pleasant, smiling, states was happy to see her  this evening  Denies SI, HI, AVH, depression, anxiety  Reports positively about restarting Lithium  No concerns, questions

## 2018-09-07 NOTE — PROGRESS NOTES
Pt states she is doing well, visible and social on unit  Expresses feeling glad that she is being restarted on lithium, reports effective in past  Positive family visit  Expresses having family as strong support   Denies SI

## 2018-09-07 NOTE — PLAN OF CARE
Problem: DISCHARGE PLANNING  Goal: Discharge to home or other facility with appropriate resources  INTERVENTIONS:  - Identify barriers to discharge w/patient and caregiver  - Arrange for needed discharge resources and transportation as appropriate  - Identify discharge learning needs (meds, wound care, etc )  - Arrange for interpretive services to assist at discharge as needed  - Refer to Case Management Department for coordinating discharge planning if the patient needs post-hospital services based on physician/advanced practitioner order or complex needs related to functional status, cognitive ability, or social support system   Outcome: Progressing  Pt reports feeling well on medication & focused on finding a trauma trained therapist    Problem: SELF HARM/SUICIDALITY  Goal: Will have no self-injury during hospital stay  INTERVENTIONS:  - Q 15 MINUTES: Routine safety checks  - Q WAKING SHIFT & PRN: Assess risk to determine if routine checks are adequate to maintain patient safety  - Encourage patient to participate actively in care by formulating a plan to combat response to suicidal ideation, identify supports and resources   Outcome: Progressing

## 2018-09-07 NOTE — PROGRESS NOTES
Awake and alert  Present in milieu, social with peers and attending watching television  Restful sleep  Denies SI/HI, AH/VH  Denies anxiety and depression  Frustrated with  because she is unable to speak with him because he did not pay the cell phone bill   is scheduled to visit this evening and bring in her denicezesse  Patient has had two BMs 9/6/2018  Compliant with medications, denies side effects  No questions or concerns

## 2018-09-08 PROCEDURE — 99232 SBSQ HOSP IP/OBS MODERATE 35: CPT | Performed by: PSYCHIATRY & NEUROLOGY

## 2018-09-08 RX ORDER — TOPIRAMATE 25 MG/1
25 TABLET ORAL 2 TIMES DAILY
Status: DISCONTINUED | OUTPATIENT
Start: 2018-09-08 | End: 2018-09-11 | Stop reason: HOSPADM

## 2018-09-08 RX ADMIN — TRAZODONE HYDROCHLORIDE 100 MG: 100 TABLET ORAL at 21:36

## 2018-09-08 RX ADMIN — TOPIRAMATE 25 MG: 25 TABLET, FILM COATED ORAL at 17:12

## 2018-09-08 RX ADMIN — CLONIDINE HYDROCHLORIDE 0.2 MG: 0.1 TABLET ORAL at 21:34

## 2018-09-08 RX ADMIN — PANTOPRAZOLE SODIUM 40 MG: 40 TABLET, DELAYED RELEASE ORAL at 15:53

## 2018-09-08 RX ADMIN — LITHIUM CARBONATE 600 MG: 300 CAPSULE, GELATIN COATED ORAL at 17:12

## 2018-09-08 RX ADMIN — PANTOPRAZOLE SODIUM 40 MG: 40 TABLET, DELAYED RELEASE ORAL at 06:27

## 2018-09-08 NOTE — PROGRESS NOTES
Pt signed 72 hour 9/7/18 at [facility]  Pt states she signed the 72 hour to ensure she would be out in time for Tuesday 9/11/18 when her daughter goes to look at colleges

## 2018-09-08 NOTE — PROGRESS NOTES
Progress Note - [address] 51 y o  female MRN: [id number]  Unit/Bed#: Eddie Jean 888-97 Encounter: [id number]    The patient was seen for continuing care and reviewed with treatment team     Mental Status Evaluation:  Appearance:  Adequate hygiene and grooming   Behavior:  friendly   Mood:  euthymic   Affect: broad   Speech: Normal rate and Normal volume   Thought Process: Tangential   Thought Content:  Does not verbalize delusional material   Perceptual Disturbances: Denies hallucinations and does not appear to be responding to internal stimuli   Risk Potential: No suicidal or homicidal ideation   Attention/Concentration attention span appeared shorter than expected for age   Orientation:   AAox3   Gait/Station: normal gait/station   Motor Activity: No abnormal movement noted     Progress Toward Goals: Focused on leaving in time for her daughter to visit college  Asks for topamax for mood and weight gain prevention and given her cont expansive, bright affect, tangential TP will add to help with mood stabilization  Feels lithium is working and verbalizes that if she needs to stay longer to adjust dose she will  Also reports the clonidine is helpful for her PTSD nightmares  Assessment/Plan    Principal Problem:    Bipolar 1 disorder, depressed, severe (HCC)  Active Problems:    PTSD (post-traumatic stress disorder)      Recommended Treatment: Continue with pharmacotherapy, group therapy, milieu therapy and occupational therapy    The patient will be maintained on the following medications:    Current Facility-Administered Medications:  acetaminophen 650 mg Oral Q6H PRN Viona Prom, PA-C   aluminum-magnesium hydroxide-simethicone 30 mL Oral Q4H PRN Viona Prom, PA-C   benztropine 1 mg Intramuscular Q6H PRN Viona Prom, PA-C   benztropine 1 mg Oral Q6H PRN Viona Prom, PA-C   cloNIDine 0 2 mg Oral  [facility]   haloperidol 5 mg Oral Q6H PRN Viona Prom, PA-C haloperidol lactate 5 mg Intramuscular Q6H PRN Rosevelt Diaz, PA-C   Linaclotide 1 capsule Oral Daily [facility]   lithium carbonate 600 mg Oral After Dinner [facility]   LORazepam 2 mg Intramuscular Q6H PRN Rosevelt Diaz, PA-C   LORazepam 1 mg Oral Q6H PRN Rosevelt Diaz, PA-C   magnesium hydroxide 30 mL Oral Daily PRN Rosevelt Diaz, PA-C   OLANZapine 10 mg Intramuscular Q3H PRN Rosevelt Diaz, PA-C   OLANZapine 10 mg Oral Q3H PRN Rosevelt Diaz, PA-C   pantoprazole 40 mg Oral BID AC Kaylin Kidd, PA-C   risperiDONE 1 mg Oral Q3H PRN Rosevelt Diaz, PA-C   traZODone 100 mg Oral HS [facility] Allred   traZODone 50 mg Oral HS PRN Rosevelt Diaz, PA-C       Risks, benefits and possible side effects of Medications:   Risks, benefits, and possible side effects of medications explained to patient and patient verbalizes understanding

## 2018-09-08 NOTE — PROGRESS NOTES
Pt friendly and talkative on 1:1  Visible and social on unit, attending most groups  States she is glad to be back on Lithium since this helped her for the past 6 years  Nervous about weight gain but said Dr  would order Topamax to help offset, if needed  States she has good support systems in her family with her  visiting the past 2 days  Hoping to be discharged for her daughter to look at college but understanding and trusting to do what the doctor believes to be best  Pt aware that blood work will need to be performed prior to DC and results be therapeutic for home med of Lithium  Pt spoke happily about her job and feeling part of teamwork    adding that she is getting paid well to do an easy job and glad to not be living off of SS  Denies SI and anxiety

## 2018-09-09 LAB
ALBUMIN SERPL BCP-MCNC: 2.9 G/DL (ref 3.5–5)
ALP SERPL-CCNC: 90 U/L (ref 46–116)
ALT SERPL W P-5'-P-CCNC: 24 U/L (ref 12–78)
ANION GAP SERPL CALCULATED.3IONS-SCNC: 6 MMOL/L (ref 4–13)
AST SERPL W P-5'-P-CCNC: 19 U/L (ref 5–45)
BILIRUB SERPL-MCNC: 0.2 MG/DL (ref 0.2–1)
BUN SERPL-MCNC: 22 MG/DL (ref 5–25)
CALCIUM SERPL-MCNC: 9.1 MG/DL (ref 8.3–10.1)
CHLORIDE SERPL-SCNC: 105 MMOL/L (ref 100–108)
CO2 SERPL-SCNC: 30 MMOL/L (ref 21–32)
CREAT SERPL-MCNC: 1.12 MG/DL (ref 0.6–1.3)
GFR SERPL CREATININE-BSD FRML MDRD: 58 ML/MIN/1.73SQ M
GLUCOSE P FAST SERPL-MCNC: 96 MG/DL (ref 65–99)
GLUCOSE SERPL-MCNC: 96 MG/DL (ref 65–140)
LITHIUM SERPL-SCNC: 0.5 MMOL/L (ref 0.5–1)
POTASSIUM SERPL-SCNC: 3.6 MMOL/L (ref 3.5–5.3)
PROT SERPL-MCNC: 7 G/DL (ref 6.4–8.2)
SODIUM SERPL-SCNC: 141 MMOL/L (ref 136–145)

## 2018-09-09 PROCEDURE — 80178 ASSAY OF LITHIUM: CPT | Performed by: PSYCHIATRY & NEUROLOGY

## 2018-09-09 PROCEDURE — 80053 COMPREHEN METABOLIC PANEL: CPT | Performed by: PSYCHIATRY & NEUROLOGY

## 2018-09-09 PROCEDURE — 99231 SBSQ HOSP IP/OBS SF/LOW 25: CPT | Performed by: PSYCHIATRY & NEUROLOGY

## 2018-09-09 RX ADMIN — TRAZODONE HYDROCHLORIDE 100 MG: 100 TABLET ORAL at 21:14

## 2018-09-09 RX ADMIN — TOPIRAMATE 25 MG: 25 TABLET, FILM COATED ORAL at 09:40

## 2018-09-09 RX ADMIN — CLONIDINE HYDROCHLORIDE 0.2 MG: 0.1 TABLET ORAL at 21:14

## 2018-09-09 RX ADMIN — TOPIRAMATE 25 MG: 25 TABLET, FILM COATED ORAL at 16:57

## 2018-09-09 RX ADMIN — PANTOPRAZOLE SODIUM 40 MG: 40 TABLET, DELAYED RELEASE ORAL at 06:13

## 2018-09-09 RX ADMIN — LITHIUM CARBONATE 600 MG: 300 CAPSULE, GELATIN COATED ORAL at 16:57

## 2018-09-09 RX ADMIN — PANTOPRAZOLE SODIUM 40 MG: 40 TABLET, DELAYED RELEASE ORAL at 16:12

## 2018-09-09 NOTE — PLAN OF CARE
Problem: SLEEP DISTURBANCE  Goal: Will exhibit normal sleeping pattern  Interventions:  -  Assess the patients sleep pattern, noting recent changes  - Administer medication as ordered  - Decrease environmental stimuli, including noise, as appropriate during the night  - Encourage the patient to actively participate in unit groups and or exercise during the day to enhance ability to achieve adequate sleep at night  - Assess the patient, in the morning, encouraging a description of sleep experience   Outcome: Progressing      Problem: DISCHARGE PLANNING  Goal: Discharge to home or other facility with appropriate resources  INTERVENTIONS:  - Identify barriers to discharge w/patient and caregiver  - Arrange for needed discharge resources and transportation as appropriate  - Identify discharge learning needs (meds, wound care, etc )  - Arrange for interpretive services to assist at discharge as needed  - Refer to Case Management Department for coordinating discharge planning if the patient needs post-hospital services based on physician/advanced practitioner order or complex needs related to functional status, cognitive ability, or social support system   Outcome: Progressing      Problem: SELF HARM/SUICIDALITY  Goal: Will have no self-injury during hospital stay  INTERVENTIONS:  - Q 15 MINUTES: Routine safety checks  - Q WAKING SHIFT & PRN: Assess risk to determine if routine checks are adequate to maintain patient safety  - Encourage patient to participate actively in care by formulating a plan to combat response to suicidal ideation, identify supports and resources   Outcome: Progressing

## 2018-09-09 NOTE — PROGRESS NOTES
Pt scant but pleasant in conversation, denying symptoms or concerns, bright affect  Out in milieu throughout shift, social with peers, cooperative

## 2018-09-09 NOTE — PROGRESS NOTES
Progress Note - [address] 51 y o  female MRN: [id number]  Unit/Bed#: Tex Dsouza 018-04 Encounter: [id number]    The patient was seen for continuing care and reviewed with treatment team     Mental Status Evaluation:  Appearance:  Adequate hygiene and grooming   Behavior:  friendly   Mood:  euthymic   Affect: broad   Speech: Normal rate and Normal volume   Thought Process:  Circumstantial   Thought Content:  Does not verbalize delusional material   Perceptual Disturbances: Denies hallucinations and does not appear to be responding to internal stimuli   Risk Potential: No suicidal or homicidal ideation   Attention/Concentration attention span and concentration were age appropriate   Orientation:      Gait/Station: normal gait/station   Motor Activity: No abnormal movement noted     Progress Toward Goals:   Lab Results   Component Value Date    LITHIUM 0 5 09/09/2018     09/09/2018    BUN 22 09/09/2018    CREATININE 1 12 09/09/2018    WBC 6 86 09/06/2018     Excited for pending discharge but did say she will stay longer if needed  Lithium level above and given improvement and in theraputic range will cont current dose  Had visit today, bright and smiling on unit with students and peers  Assessment/Plan    Principal Problem:    Bipolar 1 disorder, depressed, severe (HCC)  Active Problems:    PTSD (post-traumatic stress disorder)      Recommended Treatment: Continue with pharmacotherapy, group therapy, milieu therapy and occupational therapy    The patient will be maintained on the following medications:    Current Facility-Administered Medications:  acetaminophen 650 mg Oral Q6H PRN Kristyn Sawyer PA-C   aluminum-magnesium hydroxide-simethicone 30 mL Oral Q4H PRN Kristyn Sawyer PA-C   benztropine 1 mg Intramuscular Q6H PRN Kristyn Sawyer PA-C   benztropine 1 mg Oral Q6H PRN Kristyn Sawyer PA-C   cloNIDine 0 2 mg Oral  Bud Allred   haloperidol 5 mg Oral Q6H PRN Aida Arellano Iris Flores PA-C   haloperidol lactate 5 mg Intramuscular Q6H PRN Creed Taveras, KASEY   Linaclotide 1 capsule Oral Daily Bud Allred   lithium carbonate 600 mg Oral After Dinner Bud Allred   LORazepam 2 mg Intramuscular Q6H PRN Creed Taveras, PA-LAZARO   LORazepam 1 mg Oral Q6H PRN Creed Taveras, PA-LAZARO   magnesium hydroxide 30 mL Oral Daily PRN Creed Taveras, PA-C   OLANZapine 10 mg Intramuscular Q3H PRN Creed Taveras, PA-LAZARO   OLANZapine 10 mg Oral Q3H PRN Creed Taveras, PA-LAZARO   pantoprazole 40 mg Oral BID AC Kaylin Kidd PA-C   risperiDONE 1 mg Oral Q3H PRN Creed Taveras, KASEY   topiramate 25 mg Oral BID Harley Villarreal MD   traZODone 100 mg Oral HS Bud Allred   traZODone 50 mg Oral HS PRN Creed KASEY Taveras       Risks, benefits and possible side effects of Medications:   Risks, benefits, and possible side effects of medications explained to patient and patient verbalizes understanding

## 2018-09-10 PROCEDURE — 99232 SBSQ HOSP IP/OBS MODERATE 35: CPT | Performed by: PSYCHIATRY & NEUROLOGY

## 2018-09-10 RX ADMIN — TOPIRAMATE 25 MG: 25 TABLET, FILM COATED ORAL at 09:45

## 2018-09-10 RX ADMIN — LITHIUM CARBONATE 600 MG: 300 CAPSULE, GELATIN COATED ORAL at 17:02

## 2018-09-10 RX ADMIN — TRAZODONE HYDROCHLORIDE 100 MG: 100 TABLET ORAL at 21:15

## 2018-09-10 RX ADMIN — PANTOPRAZOLE SODIUM 40 MG: 40 TABLET, DELAYED RELEASE ORAL at 16:12

## 2018-09-10 RX ADMIN — TOPIRAMATE 25 MG: 25 TABLET, FILM COATED ORAL at 17:02

## 2018-09-10 RX ADMIN — PANTOPRAZOLE SODIUM 40 MG: 40 TABLET, DELAYED RELEASE ORAL at 07:10

## 2018-09-10 RX ADMIN — CLONIDINE HYDROCHLORIDE 0.2 MG: 0.1 TABLET ORAL at 21:14

## 2018-09-10 NOTE — CASE MANAGEMENT
CW attempted to contact Dr Dee Torres office @ [phone] several times & continued to get a busy signal   Kylie Cash called after noon & reached the answering service, who said that the office would reopen at 10 AM tomorrow  CW contacted [facility] @ [phone] & spoke with Bang Nava who reported they only take straight Medicaid  CW contacted [facility] @ [phone] & was told to call [phone] & ask for the behavioral health department  CW called & spoke to [facility] who said that she would send a message to the outpatient center to call CW about a referral     CW contacted therapist Calin Damico @ [phone] & left a message to inquire if she is accepting new patients  CW received a return call & was told that Willian Moore would call CW back with information  CW contacted therapist Jaxon Naranjo @ [phone] & left a message to inquire if she is accepting new patients  CW received a call back & Kate Davis said that at this time she is not accepting new patients  CW contacted therapist Mayra Brink @ [phone] & left a message to inquire if she is accepting new patients  CW received a call from [place] at [place], who said that she [place] [facility] & came up with [facility]  Kavitha Kaminski reviewed the provider list & 4 of the listed providers CW had already contacted & they won't take Pt's insurance  [place] agreed to email the list to Kylie Cash  List was received & CW contacted the following providers:  [address] @ [phone] & spoke with Kateryna Loza who suggested CW contact [facility] office @ [phone]  CW called & was transferred to [facility] for Kateryna Loza; Kylie Cash left a message, seeking to refer Pt for individual therapy & medication management  CW contacted Dr Dilip Chan office @ [phone] & left a message for Antonio Lopez seeking to refer Pt for services        CW contacted [facility] @ [phone] & was transferred 3 different times before being told that their office in [place] [address] is the only office with an adult psychiatrist at this time  CW contacted [address] @ [phone] & spoke with Angelita Tierney who reported that they don't have any therapist that take Medicare  CW explained it was [employer] & that it carved out to [facility]  CW was provided an intake appointment for 9/12 at 2, but Pt should arrive at 1:45 PM     CW received documents from [facility]  which were given to Pt to complete her portion

## 2018-09-10 NOTE — PROGRESS NOTES
Awake and alert  Social with peers  Denies SI/HI  Hopeful to attend [employer] tour on Tuesday  Rescinded 72 hour notice this AM with MD  Compliant with medications, denies side effects  Improved mood  No questions or concerns

## 2018-09-10 NOTE — PROGRESS NOTES
Progress Note - [address] 51 y o  female MRN: [id number]  Unit/Bed#: Chiki Baird 280-16 Encounter: [id number]    Assessment/Plan   Principal Problem:    Bipolar 1 disorder, depressed, severe ([address] )  Active Problems:    PTSD (post-traumatic stress disorder)      Subjective:   Patient is compliant with medication with no acute side effects  Patient reports slow improvement in mood and anxiety  Patient's lithium level is therapeutic at 0 5 today  No signs of lithium toxicity or side effect noted  Patient is consenting for safety on the unit  Current Medications:    Current Facility-Administered Medications:  acetaminophen 650 mg Oral Q6H PRN Sheliah Genre, PA-C   aluminum-magnesium hydroxide-simethicone 30 mL Oral Q4H PRN Sheliah Genre, PA-C   benztropine 1 mg Intramuscular Q6H PRN Sheliah Genre, PA-C   benztropine 1 mg Oral Q6H PRN Sheliah Genre, PA-C   cloNIDine 0 2 mg Oral HS [facility]   haloperidol 5 mg Oral Q6H PRN Sheliah Genre, PA-C   haloperidol lactate 5 mg Intramuscular Q6H PRN Sheliah Genre, PA-C   Linaclotide 1 capsule Oral Daily [facility]   lithium carbonate 600 mg Oral After Dinner [facility]   LORazepam 2 mg Intramuscular Q6H PRN Sheliah Genre, PA-C   LORazepam 1 mg Oral Q6H PRN Sheliah Genre, PA-C   magnesium hydroxide 30 mL Oral Daily PRN Sheliah Genre, PA-C   OLANZapine 10 mg Intramuscular Q3H PRN Sheliah Genre, PA-C   OLANZapine 10 mg Oral Q3H PRN Sheliah Genre, PA-C   pantoprazole 40 mg Oral BID AC Kaylin Kidd, PA-C   risperiDONE 1 mg Oral Q3H PRN Sheliah Genre, PA-C   topiramate 25 mg Oral BID Amberly Jay MD   traZODone 100 mg Oral HS [facility]   traZODone 50 mg Oral HS PRN Sheliah Genre, PA-C       Behavioral Health Medications: all current active meds have been reviewed      Vital signs in last 24 hours:  Temp:  [96 5 °F (35 8 °C)-96 7 °F (35 9 °C)] 96 5 °F (35 8 °C)  HR:  [61-86] 77  Resp:  [18] 18  BP: ()/(51-59) 115/59    Laboratory results:    I have personally reviewed all pertinent laboratory/tests results    Labs in last 72 hours:   Recent Labs      09/09/18   0658   NA  141   K  3 6   CL  105   CO2  30   BUN  22   CREATININE  1 12   GLUC  96   GLUF  96   CALCIUM  9 1   AST  19   ALT  24   ALKPHOS  90   TP  7 0   ALB  2 9*   TBILI  0 20   LITHIUM  0 5     Admission Labs:   Admission on 09/05/2018   Component Date Value    Color, UA 09/06/2018 Yellow     Clarity, UA 09/06/2018 Clear     Specific Gravity, UA 09/06/2018 >=1 030     pH, UA 09/06/2018 5 5     Leukocytes, UA 09/06/2018 Negative     Nitrite, UA 09/06/2018 Negative     Protein, UA 09/06/2018 Negative     Glucose, UA 09/06/2018 Negative     Ketones, UA 09/06/2018 Negative     Urobilinogen, UA 09/06/2018 0 2     Bilirubin, UA 09/06/2018 Negative     Blood, UA 09/06/2018 Negative     WBC 09/06/2018 6 86     RBC 09/06/2018 4 10     Hemoglobin 09/06/2018 12 2     Hematocrit 09/06/2018 37 1     MCV 09/06/2018 91     MCH 09/06/2018 29 8     MCHC 09/06/2018 32 9     RDW 09/06/2018 13 0     MPV 09/06/2018 9 7     Platelets 04/20/5141 304     nRBC 09/06/2018 0     Neutrophils Relative 09/06/2018 51     Immat GRANS % 09/06/2018 0     Lymphocytes Relative 09/06/2018 38     Monocytes Relative 09/06/2018 9     Eosinophils Relative 09/06/2018 2     Basophils Relative 09/06/2018 0     Neutrophils Absolute 09/06/2018 3 47     Immature Grans Absolute 09/06/2018 0 02     Lymphocytes Absolute 09/06/2018 2 62     Monocytes Absolute 09/06/2018 0 58     Eosinophils Absolute 09/06/2018 0 14     Basophils Absolute 09/06/2018 0 03     Sodium 09/06/2018 143     Potassium 09/06/2018 3 9     Chloride 09/06/2018 105     CO2 09/06/2018 33*    ANION GAP 09/06/2018 5     BUN 09/06/2018 24     Creatinine 09/06/2018 0 99     Glucose 09/06/2018 105     Calcium 09/06/2018 8 8     AST 09/06/2018 18     ALT 09/06/2018 30     Alkaline Phosphatase 09/06/2018 94     Total Protein 09/06/2018 6 9     Albumin 09/06/2018 2 9*    Total Bilirubin 09/06/2018 0 20     eGFR 09/06/2018 67     Preg, Serum 09/06/2018 Negative     Cholesterol 09/06/2018 169     Triglycerides 09/06/2018 88     HDL, Direct 09/06/2018 48     LDL Calculated 09/06/2018 103*    Non-HDL-Chol (CHOL-HDL) 09/06/2018 121     RPR 09/06/2018 Non-Reactive     Hemoglobin A1C 09/06/2018 5 6     EAG 09/06/2018 114     TSH 3RD GENERATON 09/06/2018 0 483     Sodium 09/09/2018 141     Potassium 09/09/2018 3 6     Chloride 09/09/2018 105     CO2 09/09/2018 30     ANION GAP 09/09/2018 6     BUN 09/09/2018 22     Creatinine 09/09/2018 1 12     Glucose 09/09/2018 96     Glucose, Fasting 09/09/2018 96     Calcium 09/09/2018 9 1     AST 09/09/2018 19     ALT 09/09/2018 24     Alkaline Phosphatase 09/09/2018 90     Total Protein 09/09/2018 7 0     Albumin 09/09/2018 2 9*    Total Bilirubin 09/09/2018 0 20     eGFR 09/09/2018 58     Lithium Lvl 09/09/2018 0 5        Psychiatric Review of Systems:  Behavior over the last 24 hours:  improving  Sleep: normal  Appetite: normal  Medication side effects: No  ROS: no complaints    Mental Status Evaluation:  Appearance:  casually dressed   Behavior:  guarded   Speech:  soft   Mood:  anxious   Affect:  constricted   Language naming objects and repeating phrases   Thought Process:  circumstantial   Thought Content:  obsessions   Perceptual Disturbances: None   Risk Potential: Suicidal Ideations none, Homicidal Ideations none and Potential for Aggression No   Sensorium:  person, place and time/date   Cognition:  grossly intact   Consciousness:  awake    Attention: attention span appeared shorter than expected for age   Insight:  limited   Judgment: limited   Intellect fair   Gait/Station: normal gait/station and normal balance   Motor Activity: no abnormal movements     Memory: Short and long term memory fair     Progress Toward Goals: slow progress    Recommended Treatment:   Continue with group therapy, milieu therapy and occupational therapy  Continue following current medications:   Current Facility-Administered Medications:  acetaminophen 650 mg Oral Q6H PRN Judi Mace, KASEY   aluminum-magnesium hydroxide-simethicone 30 mL Oral Q4H PRN Judi Mace, PA-C   benztropine 1 mg Intramuscular Q6H PRN Judi Mace, PA-C   benztropine 1 mg Oral Q6H PRN Judi Mace, PA-C   cloNIDine 0 2 mg Oral HS [facility]   haloperidol 5 mg Oral Q6H PRN Judi Mace, PA-C   haloperidol lactate 5 mg Intramuscular Q6H PRN Judi Mace, PA-C   Linaclotide 1 capsule Oral Daily [facility]   lithium carbonate 600 mg Oral After Dinner [facility]   LORazepam 2 mg Intramuscular Q6H PRN Judi Mace, PA-C   LORazepam 1 mg Oral Q6H PRN Judi Mace, PA-C   magnesium hydroxide 30 mL Oral Daily PRN Judi Mace, PA-C   OLANZapine 10 mg Intramuscular Q3H PRN Judi Mace, PA-C   OLANZapine 10 mg Oral Q3H PRN Judi Mace, PA-C   pantoprazole 40 mg Oral BID AC Kaylin Kidd PA-C   risperiDONE 1 mg Oral Q3H PRN Judi Mace, PA-C   topiramate 25 mg Oral BID Tito Molina MD   traZODone 100 mg Oral HS [facility]   traZODone 50 mg Oral HS PRN Judi Mace, PA-C       Risks, benefits and possible side effects of Medications:   Risks, benefits, and possible side effects of medications explained to patient and patient verbalizes understanding  Risks of medications in pregnancy explained if female patient  Patient verbalizes understanding and agrees to notify her doctor if she becomes pregnant  This note has been constructed using a voice recognition system  There may be translation, syntax,  or grammatical errors  If you have any questions, please contact the dictating provider

## 2018-09-10 NOTE — CASE MANAGEMENT
CW contacted [facility] outpatient @ [phone], however, no one was in the office yet  CW called back later & spoke with Shaylee Segundo who said they are not accepting any Medicare plans  CW contacted Jamshid Martínez, PhD @ [phone] & was informed that she is not accepting new patients  CW contacted [facility] @ [phone] & spoke with Pineda Ralph who reported they only cover the [facility] around [place], and [place] is not one of them  CW contacted Dr Viral Frausto office @ [phone], however, reached a busy signal with several attempts  CW met with Pt who asked that CW contact her employer, because they are not returning calls to her   WILL obtained for [id number] [employer] provided phone number [phone]  CW called & spoke with Jesus Stewart who said that they have been trying to contact Pt for over a week, & they need to speak to her directly  CW reported Pt had said that her  had called & spoke with several different people, however, Jesus Stewart said that neither Pt nor her /fiance contacted anyone  CW agreed to go onto the unit & have Pt call Charis back in 5 minutes

## 2018-09-10 NOTE — DISCHARGE INSTR - OTHER ORDERS
24/7 Fred Cheek Malik Maegan Murrieta;  Call: [phone]  TTY: [phone]  Toll Free: [phone]  [address] [facility] line - [phone]    Crisis Intervention   [facility] Crisis Telephone Service [phone] provides 24 hour, 7 day a week crisis phone service for any individual in mental health crisis in [facility]  The telephone service is a hotline, manned by a trained professional  Individuals can receive support, information and referral services 24 hours a day 7 days a week  Crisis Text Line is free, 24/7 support for those in crisis  Text HOME to 390506 from anywhere in the [place] to text with a trained Crisis Counselor  The [address] is a toll free telephone line that can be accessed by consumers who are looking for someone to talk to for support or guidance  It is a support service that is designed to promote recovery while focusing on strengths and providing guidance  This line is run by a provider, but is staffed with consumers and/or certified peer specialists who receive a great deal of training and supervision  MH/DS funds this program  The phone number is [phone]  The hours of operation are from 6 PM to 10 PM daily  NAME SPECIALITY INSURANCES/PAYMENTS ADDRESS/PHONE   Depression/Bipolar Support Group 1st & 3rd Wednesdays of each Month  7:00 pm  9:00 pm [facility] [address], [address] [place] [address]  [phone]   [facility] ([place] [id number] [facility]) Meetings 1st & 3rd Wednesday at [facility] 7 p m      Free          [phone] Maynard Kocher) or    [phone] Farzaneh Koo

## 2018-09-10 NOTE — PROGRESS NOTES
Pt is very concerned about being discharged early tomorrow, states she needs to leave by 0900  Pt is bright and sociable with peers  Verbalizes readiness for discharge  Anxious

## 2018-09-10 NOTE — CASE MANAGEMENT
CW met with Pt & assisted her with calling Candida Mendoza in [address] at [facility]  Pt explained that her  & daughter have outreached to various supervisors  Pt spoke with Candida Mendoza, & then handed the phone to  Jordyn Herrera spoke with Jody Alba, who reported they would need to send a treatment certification form for the attending physician to complete & CW provided her fax number  Pt then used CW's phone to contact her fiance, Quoc Calhoun, to let him know that HR would be contacting him as well  Pt reported feeling well & ready for discharge  Pt had been hopeful to leave today, however, understood the importance of her follow-up appointments, which still needed to be scheduled  CW & Pt reviewed & signed IMM  As of 12:05 PM, CW had not received any faxes, & so she called Candida Mendoza @ [phone]  & she reported they had been busy, but would fax the forms by the end of the day  CW spoke with Rick Nguyen at [place], who provided phone numbers for [facility] behavioral @ [phone] & [phone]  CW called & spoke with Onesimo Allan, who reported their only outpatient behavioral health was in [place]; [place] had contacted them earlier & they would not see Pt due to her not being in their catchment area  CW contacted Izabel Hilton @ [phone] & left a message, seeking assistance with locating providers

## 2018-09-10 NOTE — DISCHARGE INSTR - APPOINTMENTS
Wednesday, September 12, 2018 at 1:45 PM: intake appointment for individual therapy & medication management at [facility]   If using GPS for directions, they recommended using address [address] ([facility]) and they are located directly across from this location      Monday, September 17, 2018 at 4:40 PM: follow-up appointment with Dr Krishna Samayoa

## 2018-09-11 VITALS
DIASTOLIC BLOOD PRESSURE: 58 MMHG | BODY MASS INDEX: 35.82 KG/M2 | SYSTOLIC BLOOD PRESSURE: 101 MMHG | RESPIRATION RATE: 20 BRPM | WEIGHT: 222.88 LBS | TEMPERATURE: 97.1 F | HEIGHT: 66 IN | HEART RATE: 75 BPM

## 2018-09-11 PROCEDURE — 99239 HOSP IP/OBS DSCHRG MGMT >30: CPT | Performed by: PSYCHIATRY & NEUROLOGY

## 2018-09-11 RX ORDER — TRAZODONE HYDROCHLORIDE 100 MG/1
100 TABLET ORAL
Qty: 30 TABLET | Refills: 1 | Status: SHIPPED | OUTPATIENT
Start: 2018-09-11 | End: 2021-11-10 | Stop reason: ALTCHOICE

## 2018-09-11 RX ORDER — CLONIDINE HYDROCHLORIDE 0.2 MG/1
0.2 TABLET ORAL
Qty: 30 TABLET | Refills: 1 | Status: SHIPPED | OUTPATIENT
Start: 2018-09-11 | End: 2021-11-10 | Stop reason: ALTCHOICE

## 2018-09-11 RX ORDER — LITHIUM CARBONATE 600 MG/1
600 CAPSULE ORAL
Qty: 30 CAPSULE | Refills: 1 | Status: SHIPPED | OUTPATIENT
Start: 2018-09-11 | End: 2021-11-10 | Stop reason: ALTCHOICE

## 2018-09-11 RX ORDER — TOPIRAMATE 25 MG/1
25 TABLET ORAL 2 TIMES DAILY
Qty: 60 TABLET | Refills: 1 | Status: SHIPPED | OUTPATIENT
Start: 2018-09-11 | End: 2021-11-10 | Stop reason: ALTCHOICE

## 2018-09-11 RX ADMIN — PANTOPRAZOLE SODIUM 40 MG: 40 TABLET, DELAYED RELEASE ORAL at 07:00

## 2018-09-11 NOTE — DISCHARGE INSTR - LAB
Contact Information: If you have any questions, concerns, pended studies, tests and/or procedures, or emergencies regarding your inpatient behavioral health visit  Please contact [facility] [phone] and ask to speak to a , nurse or physician  A contact is available 24 hours/ 7 days a week at this number  Summary of Procedures Performed During your Stay:  Below is a list of major procedures performed during your hospital stay and a summary of results:  - No major procedures performed  Pending Studies     None        If studies are pending at discharge, follow up with your PCP and/or referring provider

## 2018-09-11 NOTE — DISCHARGE SUMMARY
Discharge Summary - [address] 51 y o  female MRN: [id number]  Unit/Bed#: Neelima Orr 617-92 Encounter: [id number]     Admission Date: 9/6/18        Discharge Date:  9/11/18    Attending Psychiatrist: Katelynn Garcia MD    Reason for Admission/HPI:   History of Present Illness   Patient is a 51year old female who presented to [facility] ED due to suicidal ideation with plan to overdose or drive car into a wall  Precipitating events are inability to find outpatient psychiatrist, running out of medications for 2 months, issues with neighbors, and menopause  Patient did present with manic symptoms of mood swings, lability, anger, irritability, decreased need for sleep, fluctuating energy levels, disorganization, grandiose delusions, and agitation  She did report depressive symptoms of lack of appetite, anhedonia, guilt, and hopelessness  Anxiety was rated as high with panic attacks  Patient does have history of PTSD with increased intrusive nightmares related to trauma  She denied psychosis  Patient does prior inpatient psychiatric hospitalizations, has 1 prior overdose attempt, and was in search of new outpatient psychiatrist     Psychosocial Stressors: running out of medications, residential, health      Hospital Course:   Behavioral Health Medications:   current meds:   Current Facility-Administered Medications   Medication Dose Route Frequency    acetaminophen (TYLENOL) tablet 650 mg  650 mg Oral Q6H PRN    aluminum-magnesium hydroxide-simethicone (MYLANTA) 200-200-20 mg/5 mL oral suspension 30 mL  30 mL Oral Q4H PRN    benztropine (COGENTIN) injection 1 mg  1 mg Intramuscular Q6H PRN    benztropine (COGENTIN) tablet 1 mg  1 mg Oral Q6H PRN    cloNIDine (CATAPRES) tablet 0 2 mg  0 2 mg Oral HS    haloperidol (HALDOL) tablet 5 mg  5 mg Oral Q6H PRN    haloperidol lactate (HALDOL) injection 5 mg  5 mg Intramuscular Q6H PRN    Linaclotide CAPS 145 mcg  1 capsule Oral Daily    lithium carbonate capsule 600 mg  600 mg Oral After Dinner    LORazepam (ATIVAN) 2 mg/mL injection 2 mg  2 mg Intramuscular Q6H PRN    LORazepam (ATIVAN) tablet 1 mg  1 mg Oral Q6H PRN    magnesium hydroxide (MILK OF MAGNESIA) 400 mg/5 mL oral suspension 30 mL  30 mL Oral Daily PRN    OLANZapine (ZyPREXA) IM injection 10 mg  10 mg Intramuscular Q3H PRN    OLANZapine (ZyPREXA) tablet 10 mg  10 mg Oral Q3H PRN    pantoprazole (PROTONIX) EC tablet 40 mg  40 mg Oral BID AC    risperiDONE (RisperDAL M-TABS) dispersible tablet 1 mg  1 mg Oral Q3H PRN    topiramate (TOPAMAX) tablet 25 mg  25 mg Oral BID    traZODone (DESYREL) tablet 100 mg  100 mg Oral HS    traZODone (DESYREL) tablet 50 mg  50 mg Oral HS PRN       Patient was admitted to [address] [place] [address] Inpatient Psychiatric Unit on voluntary 201 commitment for safety and stabilization  On admission patient was placed on Lithium 600mg QD for mood stabilization, Clonidine   2mg HS for PTSD related nightmares, Trazodone 100mg HS for insomnia, and Topamax 25mg BID for PTSD/migraines  Woodward level on 9/9/18 was   5 and deemed therapeutic  She tolerated medications with no acute side effects  Her mood brightened over the course of her treatment, and she was seen in [facility] interacting appropriately with peers  She did not demonstrate dangerous behavior to self or others during her inpatient stay  On day of discharge patient had reduced depression, controllable anxiety, denied psychosis, decrease in manic symptoms, and denied suicidal/homicidal ideations  Mental Status at time of Discharge:     Appearance:  casually dressed   Behavior:  cooperative   Speech:  normal pitch and normal volume   Mood:  euthymic   Affect:  mood-congruent   Thought Process:  circumstantial   Thought Content:  obsessions   Perceptual Disturbances: None   Risk Potential: Denied SI/HI   Potential for aggression: No   Sensorium:  person, place and time/date   Cognition:  grossly intact   Consciousness:  alert and awake    Attention: attention span appeared shorter than expected for age   Insight:  fair   Judgment: fair   Gait/Station: normal gait/station and normal balance   Motor Activity: no abnormal movements       Discharge Diagnosis:   Bipolar 1 disorder, depressed, severe   PTSD (post-traumatic stress disorder)      Discharge Medications:  See after visit summary for reconciled discharge medications provided to patient and family  Discharge instructions/Information to patient and family:   See after visit summary for information provided to patient and family  Provisions for Follow-Up Care:  See after visit summary for information related to follow-up care and any pertinent home health orders  Discharge Statement   I spent 34 minutes discharging the patient  This time was spent on the day of discharge  I had direct contact with the patient on the day of discharge  On day of discharge patient had mental status exam performed, discharge instructions/medications reviewed, and outpatient planning discussed  She was given 1 month plus 1 refill of scripts       Santi Rodriguez PA-C

## 2018-09-11 NOTE — PROGRESS NOTES
Awake and alert  ADLs completed  Expressed readiness for discharge  Denies SI/HI  Compliant with medications, denies side effects  Looking forward to visiting colleges today with her daughter  Encouraged compliance with medications and OP appointments  No questions or concerns

## 2018-09-11 NOTE — CASE MANAGEMENT
CW faxed the completed medical certification forms to Rashid Barajas in HR at [facility]  Pilar Wood @ [phone]  CW called Torey Ortega @ [phone] [id number] & left a message, seeking to confirm the forms were received

## 2018-09-11 NOTE — DISCHARGE INSTRUCTIONS
Bipolar Disorder   WHAT YOU NEED TO KNOW:   Bipolar disorder is a long-term chemical imbalance that causes rapid changes in mood and behavior  High moods are called stefan  Low moods are called depression  Sometimes you will feel manic and sometimes you will feel depressed  You can have alternating episodes of stefan and depression  This is called a mixed bipolar state  DISCHARGE INSTRUCTIONS:   Call 911 if:   · You think about hurting yourself or someone else  Contact your healthcare provider or psychiatrist if:   · You are having trouble managing your bipolar disorder  · You cannot sleep, or are sleeping all the time  · You cannot eat, or are eating more than usual     · You feel dizzy or your stomach is upset  · You cannot make it to your next meeting  · You have questions or concerns about your condition or care  Medicines:   · Medicines  may be given to help keep your mood stable, or to help you sleep  Changes in medicine are often needed as your bipolar disorder changes  · Take your medicine as directed  Contact your healthcare provider if you think your medicine is not helping or if you have side effects  Tell him or her if you are allergic to any medicine  Keep a list of the medicines, vitamins, and herbs you take  Include the amounts, and when and why you take them  Bring the list or the pill bottles to follow-up visits  Carry your medicine list with you in case of an emergency  Follow up with your healthcare provider or psychiatrist as directed:  Write down your questions so you remember to ask them during your visits  Manage bipolar disorder:  Watch for triggers of bipolar disorder symptoms, such as stress  Learn new ways to relax, such as deep breathing, to manage your stress  Tell someone if you feel a manic or depressive period might be coming on  Ask a friend or family member to help watch you for bipolar symptoms   Work to develop skills that will help you manage bipolar disorder  You may need to make lifestyle changes  Ask your healthcare provider or psychiatrist for resources  For support and more information:   · [address] [facility]), [address] [facility], [address], [address]  [place] [address]   Phone: [phone]  Phone: [phone]  Web Address: [facility]  · Depression and [address] ([facility])  [address] [place] [address], [address] [place] [address] , [address]  Phone: [phone]  Web Address: [employer] no  org   © 2017 [address] [facility] Information is for End User's use only and may not be sold, redistributed or otherwise used for commercial purposes  All illustrations and images included in CareNotes® are the copyrighted property of [employer]  or Jm [employer]  The above information is an  only  It is not intended as medical advice for individual conditions or treatments  Talk to your doctor, nurse or pharmacist before following any medical regimen to see if it is safe and effective for you

## 2018-09-12 NOTE — CASE MANAGEMENT
CW returned a phone call to Merna Thomason in [address] [place] [address] at [facility] who confirmed she did receive the forms  She did ask about Pt needing continued care & CW reviewed that would be up to her outpatient provider to determine  Kvng Almanzar agreed to follow-up with Pt

## 2018-09-14 DIAGNOSIS — K59.00 CONSTIPATION: ICD-10-CM

## 2018-09-14 RX ORDER — LINACLOTIDE 145 UG/1
CAPSULE, GELATIN COATED ORAL
Qty: 30 CAPSULE | Refills: 0 | Status: SHIPPED | OUTPATIENT
Start: 2018-09-14 | End: 2018-09-21 | Stop reason: SDUPTHER

## 2018-09-21 DIAGNOSIS — K59.00 CONSTIPATION: ICD-10-CM

## 2018-09-21 RX ORDER — LINACLOTIDE 145 UG/1
CAPSULE, GELATIN COATED ORAL
Qty: 30 CAPSULE | Refills: 0 | Status: SHIPPED | OUTPATIENT
Start: 2018-09-21 | End: 2019-03-05 | Stop reason: SDUPTHER

## 2019-03-05 DIAGNOSIS — K59.00 CONSTIPATION: ICD-10-CM

## 2019-03-05 RX ORDER — LINACLOTIDE 145 UG/1
CAPSULE, GELATIN COATED ORAL
Qty: 30 CAPSULE | Refills: 0 | Status: SHIPPED | OUTPATIENT
Start: 2019-03-05 | End: 2019-12-23 | Stop reason: SDUPTHER

## 2019-06-19 ENCOUNTER — OFFICE VISIT (OUTPATIENT)
Dept: URGENT CARE | Facility: MEDICAL CENTER | Age: 49
End: 2019-06-19
Payer: COMMERCIAL

## 2019-06-19 VITALS
WEIGHT: 226 LBS | BODY MASS INDEX: 36.32 KG/M2 | TEMPERATURE: 98.4 F | OXYGEN SATURATION: 98 % | RESPIRATION RATE: 20 BRPM | HEIGHT: 66 IN | HEART RATE: 73 BPM | SYSTOLIC BLOOD PRESSURE: 122 MMHG | DIASTOLIC BLOOD PRESSURE: 84 MMHG

## 2019-06-19 DIAGNOSIS — L03.317 CELLULITIS AND ABSCESS OF BUTTOCK: Primary | ICD-10-CM

## 2019-06-19 DIAGNOSIS — L02.31 CELLULITIS AND ABSCESS OF BUTTOCK: Primary | ICD-10-CM

## 2019-06-19 PROCEDURE — S9088 SERVICES PROVIDED IN URGENT: HCPCS | Performed by: PHYSICIAN ASSISTANT

## 2019-06-19 PROCEDURE — 99213 OFFICE O/P EST LOW 20 MIN: CPT | Performed by: PHYSICIAN ASSISTANT

## 2019-06-19 RX ORDER — CEPHALEXIN 500 MG/1
500 CAPSULE ORAL EVERY 6 HOURS SCHEDULED
Qty: 28 CAPSULE | Refills: 0 | Status: SHIPPED | OUTPATIENT
Start: 2019-06-19 | End: 2019-06-26

## 2019-07-18 ENCOUNTER — TELEPHONE (OUTPATIENT)
Dept: GASTROENTEROLOGY | Facility: CLINIC | Age: 49
End: 2019-07-18

## 2019-07-18 NOTE — TELEPHONE ENCOUNTER
Yes pls schedule  Pls haver her do a two day bowel prep with Nulytely and Magnesium citrate  Pls explain that she will need it only every 5yrs if negative

## 2019-07-18 NOTE — TELEPHONE ENCOUNTER
She had colonoscopy in October of 2017  I do not see a recall entered when should she go again 3 years?     Thanks

## 2019-07-18 NOTE — TELEPHONE ENCOUNTER
Pt of Brent Li      She would like to schedule direct colon, she has had previous colons with Dr Brent Li , her sister passed from colon cancer and brother was recently diagnosed

## 2019-07-31 ENCOUNTER — OFFICE VISIT (OUTPATIENT)
Dept: OBGYN CLINIC | Facility: CLINIC | Age: 49
End: 2019-07-31
Payer: COMMERCIAL

## 2019-07-31 VITALS
DIASTOLIC BLOOD PRESSURE: 76 MMHG | SYSTOLIC BLOOD PRESSURE: 124 MMHG | WEIGHT: 234 LBS | BODY MASS INDEX: 37.61 KG/M2 | HEIGHT: 66 IN

## 2019-07-31 DIAGNOSIS — R10.2 PELVIC PAIN: Primary | ICD-10-CM

## 2019-07-31 DIAGNOSIS — N94.10 DYSPAREUNIA, FEMALE: ICD-10-CM

## 2019-07-31 DIAGNOSIS — A60.9 HSV (HERPES SIMPLEX VIRUS) ANOGENITAL INFECTION: ICD-10-CM

## 2019-07-31 DIAGNOSIS — R30.0 DYSURIA: ICD-10-CM

## 2019-07-31 DIAGNOSIS — N39.3 STRESS INCONTINENCE: ICD-10-CM

## 2019-07-31 PROBLEM — K58.1 IRRITABLE BOWEL SYNDROME WITH CONSTIPATION: Status: ACTIVE | Noted: 2017-09-27

## 2019-07-31 LAB
SL AMB  POCT GLUCOSE, UA: NORMAL
SL AMB LEUKOCYTE ESTERASE,UA: NORMAL
SL AMB POCT BLOOD,UA: NORMAL
SL AMB POCT NITRITE,UA: NORMAL
SL AMB POCT URINE PROTEIN: NORMAL

## 2019-07-31 PROCEDURE — 87086 URINE CULTURE/COLONY COUNT: CPT | Performed by: NURSE PRACTITIONER

## 2019-07-31 PROCEDURE — 81002 URINALYSIS NONAUTO W/O SCOPE: CPT | Performed by: NURSE PRACTITIONER

## 2019-07-31 PROCEDURE — 99204 OFFICE O/P NEW MOD 45 MIN: CPT | Performed by: NURSE PRACTITIONER

## 2019-07-31 RX ORDER — VALACYCLOVIR HYDROCHLORIDE 500 MG/1
500 TABLET, FILM COATED ORAL 2 TIMES DAILY
Qty: 6 TABLET | Refills: 2 | Status: SHIPPED | OUTPATIENT
Start: 2019-07-31 | End: 2020-05-28 | Stop reason: SDUPTHER

## 2019-07-31 NOTE — PROGRESS NOTES
Assessment/Plan:    No problem-specific Assessment & Plan notes found for this encounter  Diagnoses and all orders for this visit:    Pelvic pain    HSV (herpes simplex virus) anogenital infection  -     valACYclovir (VALTREX) 500 mg tablet; Take 1 tablet (500 mg total) by mouth 2 (two) times a day for 3 days    Dysuria    Dyspareunia, female    Stress incontinence          Subjective:      Patient ID: Karuna Leigh is a 47 y o  female  Pleasant 47 y o  here for BILATERAL pelvic pain complaints for the past year  She saw Nona Anderson at [facility] and never got the pelvic u/s done bc her sister was in Hospice care and just passed away at age 40 from Lymphoma and abdominal cancer which spread everywhere  She will now be taking care of her 2 nieces who are 9 and 15 yo  Pain is worse with intercourse  She denies any treatments tried  Denies fever, vaginal discharge, itching or odor  Denies new sexual partners,  and monogamous  She did have bilateral inguinal hernia repairs with mesh and is not sure if this is the cause of her pain  She is complaining of dysuria that comes and goes for the passed few weeks  She was seeing Urology at [facility] but would like to switch to a [employer]  Colonoscopy due 2020  Mammo due in august  Pap nml in 9/2018  Requests refills on her valtrex for hx of HSV  The following portions of the patient's history were reviewed and updated as appropriate:   She  has a past medical history of Anxiety, Bipolar disorder ([facility] [address] ), GERD (gastroesophageal reflux disease), IBS (irritable bowel syndrome), and PTSD (post-traumatic stress disorder)    She   Patient Active Problem List    Diagnosis Date Noted    Pelvic pain 07/31/2019    Dysuria 07/31/2019    HSV (herpes simplex virus) anogenital infection 07/31/2019    Stress incontinence 09/20/2018    Dyspareunia, female 09/20/2018    Bipolar 1 disorder, depressed, severe ([address] ) 11/11/2017    PTSD (post-traumatic stress disorder) 11/11/2017  Irritable bowel syndrome with constipation 2017     She  has a past surgical history that includes Tubal ligation; Cholecystectomy; Appendectomy; Hernia repair; Esophagogastroduodenoscopy; Hip surgery (Right); pr esophagogastroduodenoscopy transoral diagnostic (N/A, 10/13/2017);  section ();  section ();  section (); and  section ()  Her family history includes Breast cancer in her mother; Cancer in her mother; Colon cancer in her brother and sister; Diabetes in her mother; Heart disease in her mother; Ovarian cancer in her sister; Uterine cancer in her sister  She  reports that she has never smoked  She has never used smokeless tobacco  She reports that she does not drink alcohol or use drugs  Current Outpatient Medications   Medication Sig Dispense Refill    cloNIDine (CATAPRES) 0 2 mg tablet Take 1 tablet (0 2 mg total) by mouth daily at bedtime 30 tablet 1    LINZESS 145 MCG CAPS TAKE ONE CAPSULE BY MOUTH EVERY DAY 30 capsule 0    pantoprazole (PROTONIX) 40 mg tablet Take 40 mg by mouth 2 (two) times a day        diclofenac sodium (VOLTAREN) 1 % Apply 2 g topically 4 (four) times a day as needed (pain) (Patient not taking: Reported on 2019) 100 g 0    lithium carbonate 600 MG capsule Take 1 capsule (600 mg total) by mouth daily after dinner (Patient not taking: Reported on 2019) 30 capsule 1    topiramate (TOPAMAX) 25 mg tablet Take 1 tablet (25 mg total) by mouth 2 (two) times a day At 9AM and 6PM (Patient not taking: Reported on 2019) 60 tablet 1    traZODone (DESYREL) 100 mg tablet Take 1 tablet (100 mg total) by mouth daily at bedtime (Patient not taking: Reported on 2019) 30 tablet 1    valACYclovir (VALTREX) 500 mg tablet Take 1 tablet (500 mg total) by mouth 2 (two) times a day for 3 days 6 tablet 2     No current facility-administered medications for this visit        She is allergic to percocet [oxycodone-acetaminophen]  OB History    Para Term  AB Living   9 5 5   4 5   SAB TAB Ectopic Multiple Live Births   3       5      # Outcome Date GA Lbr Robbie/2nd Weight Sex Delivery Anes PTL Lv   9 AB            8 SAB            7 SAB            6 SAB            5 Term            4 Term            3 Term            2 Term            1 Term              Stay at home mom  Review of Systems   Constitutional: Negative for activity change, chills, fatigue, fever and unexpected weight change  HENT: Negative for mouth sores and trouble swallowing  Respiratory: Negative for shortness of breath  Gastrointestinal: Positive for constipation  Negative for anal bleeding, blood in stool and diarrhea  IBS   Genitourinary: Positive for dysuria and pelvic pain  Negative for difficulty urinating, genital sores, hematuria, vaginal bleeding, vaginal discharge and vaginal pain  Neurological: Negative for weakness  Psychiatric/Behavioral: Negative for confusion and self-injury  Objective:      /76 (BP Location: Right arm, Patient Position: Sitting)   Ht 5' 6" (1 676 m)   Wt 106 kg (234 lb)   Breastfeeding? No   BMI 37 77 kg/m²          Physical Exam   Constitutional: She appears well-developed and well-nourished  She is cooperative  No distress  Eyes: EOM are normal    Neck: Normal range of motion  Pulmonary/Chest: Effort normal    Abdominal: Soft  Hernia confirmed negative in the right inguinal area and confirmed negative in the left inguinal area  Genitourinary: No labial fusion  There is no rash, tenderness, lesion or injury on the right labia  There is no rash, tenderness, lesion or injury on the left labia  Uterus is not deviated, not enlarged, not fixed and not tender  Cervix exhibits no motion tenderness, no discharge and no friability  Right adnexum displays no mass, no tenderness and no fullness  Left adnexum displays no mass, no tenderness and no fullness   No erythema, tenderness or bleeding in the vagina  No foreign body in the vagina  No signs of injury around the vagina  No vaginal discharge found  Lymphadenopathy:        Right: No inguinal adenopathy present  Left: No inguinal adenopathy present  Skin: She is not diaphoretic         LIMITED EXAM D/T OBESITY  Hx of bilateral mesh for hernia repairs, no palpable mesh on exam

## 2019-07-31 NOTE — PATIENT INSTRUCTIONS
Pelvic Pain in Women   AMBULATORY CARE:   Pelvic pain  may happen on one or both sides of your pelvis  Pelvic pain may occur with certain body positions or activities, such as when you have sex or a bowel movement  It may worsen during your monthly period or after you sit or stand for a long time  Chronic pelvic pain is pain that continues for longer than 6 months  Call 911 for any of the following:   · You have severe chest pain and sudden trouble breathing  Seek care immediately if:   · You have heavy or unusual vaginal bleeding, and you feel lightheaded or faint  Contact your healthcare provider if:   · You have pelvic pain that does not go away after you take pain medicine  · You develop new symptoms or your symptoms are worse than before  · You have questions or concerns about your condition or care  Medicines: You may need any of the following:  · Pain medicine  may be given in pills or creams to relieve your pain  · Hormones  may be given if your pain gets worse with your menstrual cycle  · Antibiotics  may be given if your pain is caused by infection  · Take your medicine as directed  Contact your healthcare provider if you think your medicine is not helping or if you have side effects  Tell him or her if you are allergic to any medicine  Keep a list of the medicines, vitamins, and herbs you take  Include the amounts, and when and why you take them  Bring the list or the pill bottles to follow-up visits  Carry your medicine list with you in case of an emergency  Self-care:   · Keep a pain diary  Write down when your pain happens, how severe it is, and any other symptoms you have with your pain  A diary will help you keep track of pain cycles  It may also help your healthcare provider find out what is causing your pain  · Learn ways to relax  Deep breathing, meditation, and relaxation techniques can help decrease your pain   When you are tense, your pain may increase  · Change the foods you eat if you have irritable bowel syndrome  Ask your healthcare provider about the best foods for you  Follow up with your healthcare provider as directed:  Write down your questions so you remember to ask them during your visits  © 2017 [address] [employer] Information is for End User's use only and may not be sold, redistributed or otherwise used for commercial purposes  All illustrations and images included in CareNotes® are the copyrighted property of A [employer] A M [employer]  or Jm [employer]  The above information is an  only  It is not intended as medical advice for individual conditions or treatments  Talk to your doctor, nurse or pharmacist before following any medical regimen to see if it is safe and effective for you

## 2019-08-01 LAB — BACTERIA UR CULT: NORMAL

## 2019-08-02 ENCOUNTER — TELEPHONE (OUTPATIENT)
Dept: OBGYN CLINIC | Facility: CLINIC | Age: 49
End: 2019-08-02

## 2019-08-02 NOTE — TELEPHONE ENCOUNTER
----- Message from Scott Carpenter, [address] sent at 8/2/2019  9:14 AM EDT -----  Please notify urine culture was within normal limits  Thanks

## 2019-08-03 ENCOUNTER — TRANSCRIBE ORDERS (OUTPATIENT)
Dept: ADMINISTRATIVE | Facility: HOSPITAL | Age: 49
End: 2019-08-03

## 2019-08-03 ENCOUNTER — APPOINTMENT (OUTPATIENT)
Dept: LAB | Facility: MEDICAL CENTER | Age: 49
End: 2019-08-03
Payer: COMMERCIAL

## 2019-08-03 DIAGNOSIS — E78.5 HYPERLIPIDEMIA, UNSPECIFIED HYPERLIPIDEMIA TYPE: ICD-10-CM

## 2019-08-03 DIAGNOSIS — K59.00 CONSTIPATION, UNSPECIFIED CONSTIPATION TYPE: ICD-10-CM

## 2019-08-03 DIAGNOSIS — E55.9 VITAMIN D DEFICIENCY: ICD-10-CM

## 2019-08-03 DIAGNOSIS — R53.83 FATIGUE, UNSPECIFIED TYPE: Primary | ICD-10-CM

## 2019-08-03 DIAGNOSIS — R53.83 FATIGUE, UNSPECIFIED TYPE: ICD-10-CM

## 2019-08-03 LAB
25(OH)D3 SERPL-MCNC: 27.2 NG/ML (ref 30–100)
ALBUMIN SERPL BCP-MCNC: 3.4 G/DL (ref 3.5–5)
ALP SERPL-CCNC: 93 U/L (ref 46–116)
ALT SERPL W P-5'-P-CCNC: 44 U/L (ref 12–78)
ANION GAP SERPL CALCULATED.3IONS-SCNC: 3 MMOL/L (ref 4–13)
AST SERPL W P-5'-P-CCNC: 31 U/L (ref 5–45)
BACTERIA UR QL AUTO: ABNORMAL /HPF
BASOPHILS # BLD AUTO: 0.05 THOUSANDS/ΜL (ref 0–0.1)
BASOPHILS NFR BLD AUTO: 1 % (ref 0–1)
BILIRUB SERPL-MCNC: 0.5 MG/DL (ref 0.2–1)
BILIRUB UR QL STRIP: NEGATIVE
BUN SERPL-MCNC: 30 MG/DL (ref 5–25)
CALCIUM SERPL-MCNC: 8.6 MG/DL (ref 8.3–10.1)
CHLORIDE SERPL-SCNC: 107 MMOL/L (ref 100–108)
CHOLEST SERPL-MCNC: 181 MG/DL (ref 50–200)
CLARITY UR: ABNORMAL
CO2 SERPL-SCNC: 32 MMOL/L (ref 21–32)
COLOR UR: YELLOW
CREAT SERPL-MCNC: 0.85 MG/DL (ref 0.6–1.3)
EOSINOPHIL # BLD AUTO: 0.22 THOUSAND/ΜL (ref 0–0.61)
EOSINOPHIL NFR BLD AUTO: 3 % (ref 0–6)
ERYTHROCYTE [DISTWIDTH] IN BLOOD BY AUTOMATED COUNT: 13.6 % (ref 11.6–15.1)
GFR SERPL CREATININE-BSD FRML MDRD: 80 ML/MIN/1.73SQ M
GLUCOSE P FAST SERPL-MCNC: 81 MG/DL (ref 65–99)
GLUCOSE UR STRIP-MCNC: NEGATIVE MG/DL
HCT VFR BLD AUTO: 39.4 % (ref 34.8–46.1)
HDLC SERPL-MCNC: 51 MG/DL (ref 40–60)
HGB BLD-MCNC: 12.9 G/DL (ref 11.5–15.4)
HGB UR QL STRIP.AUTO: NEGATIVE
HYALINE CASTS #/AREA URNS LPF: ABNORMAL /LPF
IMM GRANULOCYTES # BLD AUTO: 0.02 THOUSAND/UL (ref 0–0.2)
IMM GRANULOCYTES NFR BLD AUTO: 0 % (ref 0–2)
KETONES UR STRIP-MCNC: NEGATIVE MG/DL
LDLC SERPL CALC-MCNC: 111 MG/DL (ref 0–100)
LEUKOCYTE ESTERASE UR QL STRIP: NEGATIVE
LYMPHOCYTES # BLD AUTO: 2.86 THOUSANDS/ΜL (ref 0.6–4.47)
LYMPHOCYTES NFR BLD AUTO: 40 % (ref 14–44)
MAGNESIUM SERPL-MCNC: 2.3 MG/DL (ref 1.6–2.6)
MCH RBC QN AUTO: 30.3 PG (ref 26.8–34.3)
MCHC RBC AUTO-ENTMCNC: 32.7 G/DL (ref 31.4–37.4)
MCV RBC AUTO: 93 FL (ref 82–98)
MONOCYTES # BLD AUTO: 0.52 THOUSAND/ΜL (ref 0.17–1.22)
MONOCYTES NFR BLD AUTO: 7 % (ref 4–12)
NEUTROPHILS # BLD AUTO: 3.56 THOUSANDS/ΜL (ref 1.85–7.62)
NEUTS SEG NFR BLD AUTO: 49 % (ref 43–75)
NITRITE UR QL STRIP: NEGATIVE
NON-SQ EPI CELLS URNS QL MICRO: ABNORMAL /HPF
NONHDLC SERPL-MCNC: 130 MG/DL
NRBC BLD AUTO-RTO: 0 /100 WBCS
PH UR STRIP.AUTO: 5.5 [PH]
PLATELET # BLD AUTO: 355 THOUSANDS/UL (ref 149–390)
PMV BLD AUTO: 10.3 FL (ref 8.9–12.7)
POTASSIUM SERPL-SCNC: 4.1 MMOL/L (ref 3.5–5.3)
PROT SERPL-MCNC: 7.1 G/DL (ref 6.4–8.2)
PROT UR STRIP-MCNC: NEGATIVE MG/DL
RBC # BLD AUTO: 4.26 MILLION/UL (ref 3.81–5.12)
RBC #/AREA URNS AUTO: ABNORMAL /HPF
SODIUM SERPL-SCNC: 142 MMOL/L (ref 136–145)
SP GR UR STRIP.AUTO: 1.03 (ref 1–1.03)
TRIGL SERPL-MCNC: 96 MG/DL
TSH SERPL DL<=0.05 MIU/L-ACNC: 1.92 UIU/ML (ref 0.36–3.74)
UROBILINOGEN UR QL STRIP.AUTO: 0.2 E.U./DL
WBC # BLD AUTO: 7.23 THOUSAND/UL (ref 4.31–10.16)
WBC #/AREA URNS AUTO: ABNORMAL /HPF

## 2019-08-03 PROCEDURE — 80053 COMPREHEN METABOLIC PANEL: CPT

## 2019-08-03 PROCEDURE — 82306 VITAMIN D 25 HYDROXY: CPT

## 2019-08-03 PROCEDURE — 85025 COMPLETE CBC W/AUTO DIFF WBC: CPT

## 2019-08-03 PROCEDURE — 36415 COLL VENOUS BLD VENIPUNCTURE: CPT

## 2019-08-03 PROCEDURE — 84443 ASSAY THYROID STIM HORMONE: CPT

## 2019-08-03 PROCEDURE — 81001 URINALYSIS AUTO W/SCOPE: CPT | Performed by: FAMILY MEDICINE

## 2019-08-03 PROCEDURE — 83735 ASSAY OF MAGNESIUM: CPT

## 2019-08-03 PROCEDURE — 80061 LIPID PANEL: CPT

## 2019-08-03 PROCEDURE — 86618 LYME DISEASE ANTIBODY: CPT

## 2019-08-04 LAB
B BURGDOR IGG SER IA-ACNC: 0.05
B BURGDOR IGM SER IA-ACNC: 0.23

## 2019-08-16 ENCOUNTER — HOSPITAL ENCOUNTER (OUTPATIENT)
Dept: ULTRASOUND IMAGING | Facility: HOSPITAL | Age: 49
Discharge: HOME | End: 2019-08-16
Payer: COMMERCIAL

## 2019-08-16 DIAGNOSIS — N94.10 DYSPAREUNIA, FEMALE: ICD-10-CM

## 2019-08-16 DIAGNOSIS — R10.2 PELVIC PAIN: ICD-10-CM

## 2019-08-16 PROCEDURE — 76830 TRANSVAGINAL US NON-OB: CPT

## 2019-08-16 PROCEDURE — 76856 US EXAM PELVIC COMPLETE: CPT

## 2019-08-23 ENCOUNTER — TELEPHONE (OUTPATIENT)
Dept: OBGYN CLINIC | Facility: CLINIC | Age: 49
End: 2019-08-23

## 2019-08-23 ENCOUNTER — TRANSCRIBE ORDERS (OUTPATIENT)
Dept: ADMINISTRATIVE | Facility: HOSPITAL | Age: 49
End: 2019-08-23

## 2019-08-23 ENCOUNTER — HOSPITAL ENCOUNTER (OUTPATIENT)
Dept: RADIOLOGY | Facility: HOSPITAL | Age: 49
Discharge: HOME | End: 2019-08-23
Payer: COMMERCIAL

## 2019-08-23 DIAGNOSIS — R06.02 SHORTNESS OF BREATH: Primary | ICD-10-CM

## 2019-08-23 DIAGNOSIS — N91.2 AMENORRHEA: Primary | ICD-10-CM

## 2019-08-23 DIAGNOSIS — R06.02 SHORTNESS OF BREATH: ICD-10-CM

## 2019-08-23 PROCEDURE — 71046 X-RAY EXAM CHEST 2 VIEWS: CPT

## 2019-08-26 NOTE — TELEPHONE ENCOUNTER
Patient returned call - she is aware of her results and that she needs to get b/w done  Orders in pt chart

## 2019-08-30 ENCOUNTER — APPOINTMENT (OUTPATIENT)
Dept: LAB | Facility: MEDICAL CENTER | Age: 49
End: 2019-08-30
Payer: COMMERCIAL

## 2019-08-30 ENCOUNTER — TRANSCRIBE ORDERS (OUTPATIENT)
Dept: ADMINISTRATIVE | Facility: HOSPITAL | Age: 49
End: 2019-08-30

## 2019-08-30 DIAGNOSIS — G47.33 OBSTRUCTIVE SLEEP APNEA (ADULT) (PEDIATRIC): ICD-10-CM

## 2019-08-30 DIAGNOSIS — E66.01 MORBID OBESITY (HCC): ICD-10-CM

## 2019-08-30 DIAGNOSIS — E66.01 MORBID OBESITY (HCC): Primary | ICD-10-CM

## 2019-08-30 LAB
25(OH)D3 SERPL-MCNC: 25.2 NG/ML (ref 30–100)
ALBUMIN SERPL BCP-MCNC: 3.6 G/DL (ref 3.5–5)
ALP SERPL-CCNC: 90 U/L (ref 46–116)
ALT SERPL W P-5'-P-CCNC: 39 U/L (ref 12–78)
ANION GAP SERPL CALCULATED.3IONS-SCNC: 4 MMOL/L (ref 4–13)
AST SERPL W P-5'-P-CCNC: 24 U/L (ref 5–45)
BASOPHILS # BLD AUTO: 0.04 THOUSANDS/ΜL (ref 0–0.1)
BASOPHILS NFR BLD AUTO: 1 % (ref 0–1)
BILIRUB SERPL-MCNC: 0.42 MG/DL (ref 0.2–1)
BUN SERPL-MCNC: 24 MG/DL (ref 5–25)
CALCIUM SERPL-MCNC: 9 MG/DL (ref 8.3–10.1)
CHLORIDE SERPL-SCNC: 106 MMOL/L (ref 100–108)
CHOLEST SERPL-MCNC: 185 MG/DL (ref 50–200)
CO2 SERPL-SCNC: 28 MMOL/L (ref 21–32)
CREAT SERPL-MCNC: 0.89 MG/DL (ref 0.6–1.3)
EOSINOPHIL # BLD AUTO: 0.13 THOUSAND/ΜL (ref 0–0.61)
EOSINOPHIL NFR BLD AUTO: 2 % (ref 0–6)
ERYTHROCYTE [DISTWIDTH] IN BLOOD BY AUTOMATED COUNT: 13.3 % (ref 11.6–15.1)
EST. AVERAGE GLUCOSE BLD GHB EST-MCNC: 108 MG/DL
FERRITIN SERPL-MCNC: 109 NG/ML (ref 8–388)
GFR SERPL CREATININE-BSD FRML MDRD: 76 ML/MIN/1.73SQ M
GLUCOSE P FAST SERPL-MCNC: 75 MG/DL (ref 65–99)
HBA1C MFR BLD: 5.4 % (ref 4.2–6.3)
HCT VFR BLD AUTO: 41.3 % (ref 34.8–46.1)
HDLC SERPL-MCNC: 49 MG/DL (ref 40–60)
HGB BLD-MCNC: 13.5 G/DL (ref 11.5–15.4)
IMM GRANULOCYTES # BLD AUTO: 0.02 THOUSAND/UL (ref 0–0.2)
IMM GRANULOCYTES NFR BLD AUTO: 0 % (ref 0–2)
INSULIN SERPL-ACNC: 12 MU/L (ref 3–25)
LDLC SERPL CALC-MCNC: 116 MG/DL (ref 0–100)
LYMPHOCYTES # BLD AUTO: 1.85 THOUSANDS/ΜL (ref 0.6–4.47)
LYMPHOCYTES NFR BLD AUTO: 28 % (ref 14–44)
MCH RBC QN AUTO: 30.3 PG (ref 26.8–34.3)
MCHC RBC AUTO-ENTMCNC: 32.7 G/DL (ref 31.4–37.4)
MCV RBC AUTO: 93 FL (ref 82–98)
MONOCYTES # BLD AUTO: 0.68 THOUSAND/ΜL (ref 0.17–1.22)
MONOCYTES NFR BLD AUTO: 10 % (ref 4–12)
NEUTROPHILS # BLD AUTO: 4 THOUSANDS/ΜL (ref 1.85–7.62)
NEUTS SEG NFR BLD AUTO: 59 % (ref 43–75)
NONHDLC SERPL-MCNC: 136 MG/DL
NRBC BLD AUTO-RTO: 0 /100 WBCS
PLATELET # BLD AUTO: 368 THOUSANDS/UL (ref 149–390)
PMV BLD AUTO: 10.4 FL (ref 8.9–12.7)
POTASSIUM SERPL-SCNC: 3.9 MMOL/L (ref 3.5–5.3)
PROT SERPL-MCNC: 7.6 G/DL (ref 6.4–8.2)
RBC # BLD AUTO: 4.46 MILLION/UL (ref 3.81–5.12)
SODIUM SERPL-SCNC: 138 MMOL/L (ref 136–145)
TRIGL SERPL-MCNC: 100 MG/DL
TSH SERPL DL<=0.05 MIU/L-ACNC: 1.09 UIU/ML (ref 0.36–3.74)
VIT B12 SERPL-MCNC: 686 PG/ML (ref 100–900)
WBC # BLD AUTO: 6.72 THOUSAND/UL (ref 4.31–10.16)

## 2019-08-30 PROCEDURE — 36415 COLL VENOUS BLD VENIPUNCTURE: CPT

## 2019-08-30 PROCEDURE — 84443 ASSAY THYROID STIM HORMONE: CPT

## 2019-08-30 PROCEDURE — 80061 LIPID PANEL: CPT

## 2019-08-30 PROCEDURE — 85025 COMPLETE CBC W/AUTO DIFF WBC: CPT

## 2019-08-30 PROCEDURE — 83036 HEMOGLOBIN GLYCOSYLATED A1C: CPT

## 2019-08-30 PROCEDURE — 82306 VITAMIN D 25 HYDROXY: CPT

## 2019-08-30 PROCEDURE — 82607 VITAMIN B-12: CPT

## 2019-08-30 PROCEDURE — 80053 COMPREHEN METABOLIC PANEL: CPT

## 2019-08-30 PROCEDURE — 83525 ASSAY OF INSULIN: CPT

## 2019-08-30 PROCEDURE — 82728 ASSAY OF FERRITIN: CPT

## 2019-08-30 PROCEDURE — 82652 VIT D 1 25-DIHYDROXY: CPT

## 2019-09-03 LAB — 1,25(OH)2D3 SERPL-MCNC: 46.6 PG/ML (ref 19.9–79.3)

## 2019-09-28 ENCOUNTER — HOSPITAL ENCOUNTER (EMERGENCY)
Facility: HOSPITAL | Age: 49
Discharge: HOME | End: 2019-09-28
Attending: EMERGENCY MEDICINE | Admitting: EMERGENCY MEDICINE
Payer: COMMERCIAL

## 2019-09-28 VITALS
DIASTOLIC BLOOD PRESSURE: 63 MMHG | RESPIRATION RATE: 17 BRPM | TEMPERATURE: 97.7 F | HEIGHT: 66 IN | WEIGHT: 230 LBS | HEART RATE: 66 BPM | OXYGEN SATURATION: 94 % | SYSTOLIC BLOOD PRESSURE: 136 MMHG | BODY MASS INDEX: 36.96 KG/M2

## 2019-09-28 DIAGNOSIS — E86.0 DEHYDRATION, MILD: ICD-10-CM

## 2019-09-28 DIAGNOSIS — F15.23 CAFFEINE WITHDRAWAL (HCC): Primary | ICD-10-CM

## 2019-09-28 DIAGNOSIS — R51.9 HEADACHE: ICD-10-CM

## 2019-09-28 LAB
ALBUMIN SERPL BCP-MCNC: 3.4 G/DL (ref 3.5–5)
ALP SERPL-CCNC: 86 U/L (ref 46–116)
ALT SERPL W P-5'-P-CCNC: 23 U/L (ref 12–78)
ANION GAP SERPL CALCULATED.3IONS-SCNC: 9 MMOL/L (ref 4–13)
AST SERPL W P-5'-P-CCNC: 22 U/L (ref 5–45)
BASOPHILS # BLD AUTO: 0.03 THOUSANDS/ΜL (ref 0–0.1)
BASOPHILS NFR BLD AUTO: 0 % (ref 0–1)
BILIRUB SERPL-MCNC: 0.4 MG/DL (ref 0.2–1)
BUN SERPL-MCNC: 26 MG/DL (ref 5–25)
CALCIUM SERPL-MCNC: 9.2 MG/DL (ref 8.3–10.1)
CHLORIDE SERPL-SCNC: 103 MMOL/L (ref 100–108)
CO2 SERPL-SCNC: 28 MMOL/L (ref 21–32)
CREAT SERPL-MCNC: 0.84 MG/DL (ref 0.6–1.3)
EOSINOPHIL # BLD AUTO: 0.15 THOUSAND/ΜL (ref 0–0.61)
EOSINOPHIL NFR BLD AUTO: 2 % (ref 0–6)
ERYTHROCYTE [DISTWIDTH] IN BLOOD BY AUTOMATED COUNT: 12.9 % (ref 11.6–15.1)
GFR SERPL CREATININE-BSD FRML MDRD: 81 ML/MIN/1.73SQ M
GLUCOSE SERPL-MCNC: 94 MG/DL (ref 65–140)
HCT VFR BLD AUTO: 38.8 % (ref 34.8–46.1)
HGB BLD-MCNC: 12.9 G/DL (ref 11.5–15.4)
IMM GRANULOCYTES # BLD AUTO: 0.02 THOUSAND/UL (ref 0–0.2)
IMM GRANULOCYTES NFR BLD AUTO: 0 % (ref 0–2)
LYMPHOCYTES # BLD AUTO: 2.19 THOUSANDS/ΜL (ref 0.6–4.47)
LYMPHOCYTES NFR BLD AUTO: 30 % (ref 14–44)
MCH RBC QN AUTO: 30.2 PG (ref 26.8–34.3)
MCHC RBC AUTO-ENTMCNC: 33.2 G/DL (ref 31.4–37.4)
MCV RBC AUTO: 91 FL (ref 82–98)
MONOCYTES # BLD AUTO: 0.68 THOUSAND/ΜL (ref 0.17–1.22)
MONOCYTES NFR BLD AUTO: 9 % (ref 4–12)
NEUTROPHILS # BLD AUTO: 4.22 THOUSANDS/ΜL (ref 1.85–7.62)
NEUTS SEG NFR BLD AUTO: 59 % (ref 43–75)
NRBC BLD AUTO-RTO: 0 /100 WBCS
PLATELET # BLD AUTO: 339 THOUSANDS/UL (ref 149–390)
PMV BLD AUTO: 9.4 FL (ref 8.9–12.7)
POTASSIUM SERPL-SCNC: 4 MMOL/L (ref 3.5–5.3)
PROT SERPL-MCNC: 7.6 G/DL (ref 6.4–8.2)
RBC # BLD AUTO: 4.27 MILLION/UL (ref 3.81–5.12)
SODIUM SERPL-SCNC: 140 MMOL/L (ref 136–145)
WBC # BLD AUTO: 7.29 THOUSAND/UL (ref 4.31–10.16)

## 2019-09-28 PROCEDURE — 80053 COMPREHEN METABOLIC PANEL: CPT | Performed by: NURSE PRACTITIONER

## 2019-09-28 PROCEDURE — 96375 TX/PRO/DX INJ NEW DRUG ADDON: CPT

## 2019-09-28 PROCEDURE — 99284 EMERGENCY DEPT VISIT MOD MDM: CPT | Performed by: NURSE PRACTITIONER

## 2019-09-28 PROCEDURE — 85025 COMPLETE CBC W/AUTO DIFF WBC: CPT | Performed by: NURSE PRACTITIONER

## 2019-09-28 PROCEDURE — 99283 EMERGENCY DEPT VISIT LOW MDM: CPT

## 2019-09-28 PROCEDURE — 96365 THER/PROPH/DIAG IV INF INIT: CPT

## 2019-09-28 PROCEDURE — 96366 THER/PROPH/DIAG IV INF ADDON: CPT

## 2019-09-28 PROCEDURE — 36415 COLL VENOUS BLD VENIPUNCTURE: CPT | Performed by: NURSE PRACTITIONER

## 2019-09-28 RX ORDER — MAGNESIUM SULFATE HEPTAHYDRATE 40 MG/ML
2 INJECTION, SOLUTION INTRAVENOUS ONCE
Status: COMPLETED | OUTPATIENT
Start: 2019-09-28 | End: 2019-09-28

## 2019-09-28 RX ORDER — KETOROLAC TROMETHAMINE 30 MG/ML
30 INJECTION, SOLUTION INTRAMUSCULAR; INTRAVENOUS ONCE
Status: COMPLETED | OUTPATIENT
Start: 2019-09-28 | End: 2019-09-28

## 2019-09-28 RX ORDER — DIPHENHYDRAMINE HYDROCHLORIDE 50 MG/ML
25 INJECTION INTRAMUSCULAR; INTRAVENOUS ONCE
Status: COMPLETED | OUTPATIENT
Start: 2019-09-28 | End: 2019-09-28

## 2019-09-28 RX ORDER — METOCLOPRAMIDE HYDROCHLORIDE 5 MG/ML
10 INJECTION INTRAMUSCULAR; INTRAVENOUS ONCE
Status: COMPLETED | OUTPATIENT
Start: 2019-09-28 | End: 2019-09-28

## 2019-09-28 RX ORDER — BUTALBITAL, ACETAMINOPHEN AND CAFFEINE 50; 325; 40 MG/1; MG/1; MG/1
1 TABLET ORAL ONCE
Status: COMPLETED | OUTPATIENT
Start: 2019-09-28 | End: 2019-09-28

## 2019-09-28 RX ADMIN — SODIUM CHLORIDE 2000 ML: 0.9 INJECTION, SOLUTION INTRAVENOUS at 12:13

## 2019-09-28 RX ADMIN — MAGNESIUM SULFATE HEPTAHYDRATE 2 G: 40 INJECTION, SOLUTION INTRAVENOUS at 12:09

## 2019-09-28 RX ADMIN — KETOROLAC TROMETHAMINE 30 MG: 30 INJECTION, SOLUTION INTRAMUSCULAR at 12:13

## 2019-09-28 RX ADMIN — BUTALBITAL, ACETAMINOPHEN, AND CAFFEINE 1 TABLET: 50; 325; 40 TABLET ORAL at 12:13

## 2019-09-28 RX ADMIN — DIPHENHYDRAMINE HYDROCHLORIDE 25 MG: 50 INJECTION, SOLUTION INTRAMUSCULAR; INTRAVENOUS at 12:14

## 2019-09-28 RX ADMIN — METOCLOPRAMIDE 10 MG: 5 INJECTION, SOLUTION INTRAMUSCULAR; INTRAVENOUS at 12:14
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
## 2019-12-23 DIAGNOSIS — K59.00 CONSTIPATION: ICD-10-CM

## 2020-02-17 ENCOUNTER — OFFICE VISIT (OUTPATIENT)
Dept: URGENT CARE | Facility: MEDICAL CENTER | Age: 50
End: 2020-02-17
Payer: COMMERCIAL

## 2020-02-17 VITALS
OXYGEN SATURATION: 94 % | RESPIRATION RATE: 19 BRPM | WEIGHT: 204 LBS | DIASTOLIC BLOOD PRESSURE: 75 MMHG | HEART RATE: 80 BPM | HEIGHT: 66 IN | BODY MASS INDEX: 32.78 KG/M2 | SYSTOLIC BLOOD PRESSURE: 117 MMHG | TEMPERATURE: 98.2 F

## 2020-02-17 DIAGNOSIS — J02.0 STREP PHARYNGITIS: Primary | ICD-10-CM

## 2020-02-17 LAB — S PYO AG THROAT QL: POSITIVE

## 2020-02-17 PROCEDURE — 87880 STREP A ASSAY W/OPTIC: CPT | Performed by: NURSE PRACTITIONER

## 2020-02-17 PROCEDURE — S9088 SERVICES PROVIDED IN URGENT: HCPCS | Performed by: NURSE PRACTITIONER

## 2020-02-17 PROCEDURE — 99213 OFFICE O/P EST LOW 20 MIN: CPT | Performed by: NURSE PRACTITIONER

## 2020-02-17 RX ORDER — AMOXICILLIN 500 MG/1
500 CAPSULE ORAL EVERY 12 HOURS SCHEDULED
Qty: 20 CAPSULE | Refills: 0 | Status: SHIPPED | OUTPATIENT
Start: 2020-02-17 | End: 2020-02-27

## 2020-02-17 RX ORDER — FLUCONAZOLE 150 MG/1
150 TABLET ORAL ONCE
Qty: 1 TABLET | Refills: 0 | Status: SHIPPED | OUTPATIENT
Start: 2020-02-17 | End: 2020-02-17
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
## 2020-02-17 NOTE — PATIENT INSTRUCTIONS
Amoxicillin  twice a day for 10 days   Tylenol/Motrin as needed for pain/fever  Throat lozenge, salt water gargles, lozenges  Increase fluid intake   Discard toothbrush 24 hours after starting antibiotic and again after finishing antibiotics  Follow up with your PCP for worsening symptoms      Strep Throat   WHAT YOU NEED TO KNOW:   Strep throat is a throat infection caused by bacteria  It is easily spread from person to person  DISCHARGE INSTRUCTIONS:   Call 911 for any of the following:   · You have trouble breathing  Return to the emergency department if:   · You have new symptoms like a bad headache, stiff neck, chest pain, or vomiting  · You are drooling because you cannot swallow your spit  Contact your healthcare provider if:   · You have a fever  · You have a rash or ear pain  · You have green, yellow-brown, or bloody mucus when you cough or blow your nose  · You are unable to drink anything  · You have questions or concerns about your condition or care  Medicines:   · Antibiotics  help treat your strep throat  You should feel better within 2 to 3 days after you start antibiotics  · Take your medicine as directed  Contact your healthcare provider if you think your medicine is not helping or if you have side effects  Tell him or her if you are allergic to any medicine  Keep a list of the medicines, vitamins, and herbs you take  Include the amounts, and when and why you take them  Bring the list or the pill bottles to follow-up visits  Carry your medicine list with you in case of an emergency  Manage your symptoms:   · Use lozenges, ice, soft foods, or popsicles  to soothe your throat  · Drink juice, milk shakes, or soup  if your throat is too sore to eat solid food  Drinking liquids can also help prevent dehydration  · Gargle with salt water  Mix ¼ teaspoon salt in a glass of warm water and gargle  This may help reduce swelling in your throat  · Do not smoke  Nicotine and other chemicals in cigarettes and cigars can cause lung damage and make your symptoms worse  Ask your healthcare provider for information if you currently smoke and need help to quit  E-cigarettes or smokeless tobacco still contain nicotine  Talk to your healthcare provider before you use these products  Return to work or school  24 hours after you start antibiotic medicine  Prevent the spread of strep throat:   · Wash your hands often  Use soap and water  Wash your hands after you use the bathroom, change a child's diapers, or sneeze  Wash your hands before you prepare or eat food  · Do not share food or drinks  Replace your toothbrush after you have taken antibiotics for 24 hours  Follow up with your healthcare provider as directed:  Write down your questions so you remember to ask them during your visits  © 2017 [address] Information is for End User's use only and may not be sold, redistributed or otherwise used for commercial purposes  All illustrations and images included in CareNotes® are the copyrighted property of A D A M , Inc  or J[employer]  The above information is an  only  It is not intended as medical advice for individual conditions or treatments  Talk to your doctor, nurse or pharmacist before following any medical regimen to see if it is safe and effective for you

## 2020-02-17 NOTE — LETTER
February 17, 2020     Patient: Vahid Araujo   YOB: 1970   Date of Visit: 2/17/2020       To Whom it May Concern:    Vahid Araujo was seen in my clinic on 2/17/2020  She may return to work on 2/18/2020  If you have any questions or concerns, please don't hesitate to call           Sincerely,        SHAYLA Nunn      CC: Justusnelericka Brandonnatalia

## 2020-05-28 ENCOUNTER — OFFICE VISIT (OUTPATIENT)
Dept: OBGYN CLINIC | Facility: MEDICAL CENTER | Age: 50
End: 2020-05-28
Payer: COMMERCIAL

## 2020-05-28 VITALS
DIASTOLIC BLOOD PRESSURE: 84 MMHG | SYSTOLIC BLOOD PRESSURE: 110 MMHG | BODY MASS INDEX: 28.41 KG/M2 | WEIGHT: 176 LBS | TEMPERATURE: 97.8 F

## 2020-05-28 DIAGNOSIS — N76.0 BV (BACTERIAL VAGINOSIS): Primary | ICD-10-CM

## 2020-05-28 DIAGNOSIS — Z80.41 FAMILY HISTORY OF OVARIAN CANCER: ICD-10-CM

## 2020-05-28 DIAGNOSIS — Z80.0 FAMILY HISTORY OF COLON CANCER: ICD-10-CM

## 2020-05-28 DIAGNOSIS — L30.4 INTERTRIGO: ICD-10-CM

## 2020-05-28 DIAGNOSIS — A60.9 HSV (HERPES SIMPLEX VIRUS) ANOGENITAL INFECTION: ICD-10-CM

## 2020-05-28 DIAGNOSIS — Z80.3 FAMILY HISTORY OF BREAST CANCER IN FIRST DEGREE RELATIVE: ICD-10-CM

## 2020-05-28 DIAGNOSIS — B96.89 BV (BACTERIAL VAGINOSIS): Primary | ICD-10-CM

## 2020-05-28 PROCEDURE — 99214 OFFICE O/P EST MOD 30 MIN: CPT | Performed by: NURSE PRACTITIONER

## 2020-05-28 RX ORDER — VALACYCLOVIR HYDROCHLORIDE 500 MG/1
500 TABLET, FILM COATED ORAL 2 TIMES DAILY
Qty: 6 TABLET | Refills: 6 | Status: SHIPPED | OUTPATIENT
Start: 2020-05-28 | End: 2021-11-10 | Stop reason: ALTCHOICE

## 2020-05-28 RX ORDER — METRONIDAZOLE 500 MG/1
500 TABLET ORAL EVERY 12 HOURS SCHEDULED
Qty: 14 TABLET | Refills: 0 | Status: SHIPPED | OUTPATIENT
Start: 2020-05-28 | End: 2020-06-04

## 2020-05-28 RX ORDER — NYSTATIN 100000 [USP'U]/G
POWDER TOPICAL 2 TIMES DAILY
Qty: 15 G | Refills: 0 | Status: SHIPPED | OUTPATIENT
Start: 2020-05-28 | End: 2021-11-10 | Stop reason: ALTCHOICE

## 2020-06-02 ENCOUNTER — TELEPHONE (OUTPATIENT)
Dept: HEMATOLOGY ONCOLOGY | Facility: CLINIC | Age: 50
End: 2020-06-02

## 2020-09-09 ENCOUNTER — DOCUMENTATION (OUTPATIENT)
Dept: GASTROENTEROLOGY | Facility: CLINIC | Age: 50
End: 2020-09-09

## 2020-09-09 NOTE — LETTER
9/9/2020    Dear Ericka Gerardo,    Review of our records shows you are due for the following:    Colonoscopy with 2 day nulytely prep    Please call the following office to schedule your appointment:    [facility]    We look forward to hearing from you      Sincerely,    Dr Go Avalos

## 2020-09-24 ENCOUNTER — TELEPHONE (OUTPATIENT)
Dept: HEMATOLOGY ONCOLOGY | Facility: CLINIC | Age: 50
End: 2020-09-24

## 2020-09-24 NOTE — TELEPHONE ENCOUNTER
I called patient and left a message regarding patients cancelled appointment due to lack of transportation to reschedule it  I advised patient to call our [address] [facility] [address] at [phone] to reschedule her appointment with our genetic counselors, a televisit appointment can be offered if preferred

## 2021-01-14 NOTE — ED NOTES
Pt remains asleep at this time  Pt can be heard snoring  No signs of distress        Lynette Guillermo  09/05/18 0800 Unprotected UV exposure to the sun or indoor tanning devices is a known risk  factor for the development of skin cancer.    There is no scientifically validated, safe threshold level of UV exposure from the sun or indoor tanning devices that allows for maximal vitamin D synthesis without  increasing skin cancer risk.    To protect against skin cancer, a comprehensive photoprotective regimen,  including the regular use and proper use of a broad-spectrum sunscreen, is  recommended.     Ideally, sunscreen should be broad-spectrum (protecting against UVA and UVB rays), and at least SPF 30.  Sunscreen should be reapplied every 2 hours, or even more frequently if swimming or toweling off.  Make sure sunscreen is water resistant if you will be swimming or perspiring heavily.  Pregnant women should avoid sunscreen containing oxybenzone.      For more information on sunscreen:  https://www.aad.org/media/stats/prevention-and-care/sunscreen-faqs    The American Academy of Dermatology recommends that an adequate amount of vitamin D should be obtained from a healthy diet that includes foods naturally rich in vitamin D, foods/beverages fortified with vitamin D, and/or vitamin D supplements. Vitamin D should not be obtained from unprotected exposure to ultraviolet (UV) radiation.

## 2021-02-09 ENCOUNTER — APPOINTMENT (EMERGENCY)
Dept: RADIOLOGY | Facility: HOSPITAL | Age: 51
End: 2021-02-09
Payer: COMMERCIAL

## 2021-02-09 ENCOUNTER — HOSPITAL ENCOUNTER (EMERGENCY)
Facility: HOSPITAL | Age: 51
Discharge: HOME | End: 2021-02-09
Attending: EMERGENCY MEDICINE | Admitting: EMERGENCY MEDICINE
Payer: COMMERCIAL

## 2021-02-09 VITALS
HEART RATE: 83 BPM | RESPIRATION RATE: 18 BRPM | BODY MASS INDEX: 21.69 KG/M2 | TEMPERATURE: 97.8 F | SYSTOLIC BLOOD PRESSURE: 121 MMHG | OXYGEN SATURATION: 98 % | DIASTOLIC BLOOD PRESSURE: 71 MMHG | WEIGHT: 135 LBS | HEIGHT: 66 IN

## 2021-02-09 DIAGNOSIS — R07.89 ATYPICAL CHEST PAIN: Primary | ICD-10-CM

## 2021-02-09 LAB
ALBUMIN SERPL BCP-MCNC: 3.1 G/DL (ref 3.5–5)
ALP SERPL-CCNC: 62 U/L (ref 46–116)
ALT SERPL W P-5'-P-CCNC: 60 U/L (ref 12–78)
ANION GAP SERPL CALCULATED.3IONS-SCNC: 11 MMOL/L (ref 4–13)
AST SERPL W P-5'-P-CCNC: 31 U/L (ref 5–45)
BASOPHILS # BLD AUTO: 0.03 THOUSANDS/ΜL (ref 0–0.1)
BASOPHILS NFR BLD AUTO: 1 % (ref 0–1)
BILIRUB SERPL-MCNC: 0.2 MG/DL (ref 0.2–1)
BUN SERPL-MCNC: 24 MG/DL (ref 5–25)
CALCIUM ALBUM COR SERPL-MCNC: 9.2 MG/DL (ref 8.3–10.1)
CALCIUM SERPL-MCNC: 8.5 MG/DL (ref 8.3–10.1)
CHLORIDE SERPL-SCNC: 106 MMOL/L (ref 100–108)
CO2 SERPL-SCNC: 29 MMOL/L (ref 21–32)
CREAT SERPL-MCNC: 1.03 MG/DL (ref 0.6–1.3)
EOSINOPHIL # BLD AUTO: 0.13 THOUSAND/ΜL (ref 0–0.61)
EOSINOPHIL NFR BLD AUTO: 3 % (ref 0–6)
ERYTHROCYTE [DISTWIDTH] IN BLOOD BY AUTOMATED COUNT: 13.1 % (ref 11.6–15.1)
GFR SERPL CREATININE-BSD FRML MDRD: 63 ML/MIN/1.73SQ M
GLUCOSE SERPL-MCNC: 94 MG/DL (ref 65–140)
HCT VFR BLD AUTO: 32.3 % (ref 34.8–46.1)
HGB BLD-MCNC: 10.4 G/DL (ref 11.5–15.4)
IMM GRANULOCYTES # BLD AUTO: 0.01 THOUSAND/UL (ref 0–0.2)
IMM GRANULOCYTES NFR BLD AUTO: 0 % (ref 0–2)
LYMPHOCYTES # BLD AUTO: 2.1 THOUSANDS/ΜL (ref 0.6–4.47)
LYMPHOCYTES NFR BLD AUTO: 40 % (ref 14–44)
MCH RBC QN AUTO: 31 PG (ref 26.8–34.3)
MCHC RBC AUTO-ENTMCNC: 32.2 G/DL (ref 31.4–37.4)
MCV RBC AUTO: 96 FL (ref 82–98)
MONOCYTES # BLD AUTO: 0.46 THOUSAND/ΜL (ref 0.17–1.22)
MONOCYTES NFR BLD AUTO: 9 % (ref 4–12)
NEUTROPHILS # BLD AUTO: 2.55 THOUSANDS/ΜL (ref 1.85–7.62)
NEUTS SEG NFR BLD AUTO: 47 % (ref 43–75)
NRBC BLD AUTO-RTO: 0 /100 WBCS
PLATELET # BLD AUTO: 308 THOUSANDS/UL (ref 149–390)
PMV BLD AUTO: 9.3 FL (ref 8.9–12.7)
POTASSIUM SERPL-SCNC: 3.6 MMOL/L (ref 3.5–5.3)
PROT SERPL-MCNC: 6.2 G/DL (ref 6.4–8.2)
RBC # BLD AUTO: 3.35 MILLION/UL (ref 3.81–5.12)
SODIUM SERPL-SCNC: 146 MMOL/L (ref 136–145)
TROPONIN I SERPL-MCNC: <0.02 NG/ML
WBC # BLD AUTO: 5.28 THOUSAND/UL (ref 4.31–10.16)

## 2021-02-09 PROCEDURE — 93005 ELECTROCARDIOGRAM TRACING: CPT

## 2021-02-09 PROCEDURE — 99285 EMERGENCY DEPT VISIT HI MDM: CPT

## 2021-02-09 PROCEDURE — 36415 COLL VENOUS BLD VENIPUNCTURE: CPT | Performed by: EMERGENCY MEDICINE

## 2021-02-09 PROCEDURE — 80053 COMPREHEN METABOLIC PANEL: CPT | Performed by: EMERGENCY MEDICINE

## 2021-02-09 PROCEDURE — 99284 EMERGENCY DEPT VISIT MOD MDM: CPT | Performed by: EMERGENCY MEDICINE

## 2021-02-09 PROCEDURE — 84484 ASSAY OF TROPONIN QUANT: CPT | Performed by: EMERGENCY MEDICINE

## 2021-02-09 PROCEDURE — 85025 COMPLETE CBC W/AUTO DIFF WBC: CPT | Performed by: EMERGENCY MEDICINE

## 2021-02-09 PROCEDURE — 71045 X-RAY EXAM CHEST 1 VIEW: CPT

## 2021-02-09 RX ORDER — ACETAMINOPHEN 325 MG/1
975 TABLET ORAL ONCE
Status: COMPLETED | OUTPATIENT
Start: 2021-02-09 | End: 2021-02-09

## 2021-02-09 RX ADMIN — ACETAMINOPHEN 975 MG: 325 TABLET ORAL at 20:08

## 2021-02-10 LAB
ATRIAL RATE: 82 BPM
P AXIS: 81 DEGREES
PR INTERVAL: 162 MS
QRS AXIS: 80 DEGREES
QRSD INTERVAL: 78 MS
QT INTERVAL: 368 MS
QTC INTERVAL: 429 MS
T WAVE AXIS: 66 DEGREES
VENTRICULAR RATE: 82 BPM

## 2021-02-10 PROCEDURE — 93010 ELECTROCARDIOGRAM REPORT: CPT | Performed by: INTERNAL MEDICINE
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
## 2021-02-16 ENCOUNTER — TELEPHONE (OUTPATIENT)
Dept: OBGYN CLINIC | Facility: MEDICAL CENTER | Age: 51
End: 2021-02-16

## 2021-02-17 ENCOUNTER — OFFICE VISIT (OUTPATIENT)
Dept: URGENT CARE | Facility: MEDICAL CENTER | Age: 51
End: 2021-02-17
Payer: COMMERCIAL

## 2021-02-17 VITALS
WEIGHT: 134.5 LBS | HEART RATE: 68 BPM | HEIGHT: 66 IN | RESPIRATION RATE: 20 BRPM | OXYGEN SATURATION: 99 % | SYSTOLIC BLOOD PRESSURE: 110 MMHG | BODY MASS INDEX: 21.62 KG/M2 | TEMPERATURE: 98 F | DIASTOLIC BLOOD PRESSURE: 63 MMHG

## 2021-02-17 DIAGNOSIS — H65.111 ACUTE MUCOID OTITIS MEDIA OF RIGHT EAR: Primary | ICD-10-CM

## 2021-02-17 PROCEDURE — 99213 OFFICE O/P EST LOW 20 MIN: CPT | Performed by: PHYSICIAN ASSISTANT

## 2021-02-17 PROCEDURE — S9088 SERVICES PROVIDED IN URGENT: HCPCS | Performed by: PHYSICIAN ASSISTANT

## 2021-02-17 RX ORDER — AMOXICILLIN 500 MG/1
500 CAPSULE ORAL EVERY 8 HOURS SCHEDULED
Qty: 21 CAPSULE | Refills: 0 | Status: SHIPPED | OUTPATIENT
Start: 2021-02-17 | End: 2021-02-24
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
## 2021-02-25 ENCOUNTER — OFFICE VISIT (OUTPATIENT)
Dept: OBGYN CLINIC | Facility: MEDICAL CENTER | Age: 51
End: 2021-02-25
Payer: COMMERCIAL

## 2021-02-25 VITALS — DIASTOLIC BLOOD PRESSURE: 62 MMHG | SYSTOLIC BLOOD PRESSURE: 110 MMHG | WEIGHT: 135 LBS | BODY MASS INDEX: 21.79 KG/M2

## 2021-02-25 DIAGNOSIS — B37.3 VAGINA, CANDIDIASIS: Primary | ICD-10-CM

## 2021-02-25 PROBLEM — B37.31 VAGINA, CANDIDIASIS: Status: ACTIVE | Noted: 2021-02-25

## 2021-02-25 PROBLEM — N76.0 BV (BACTERIAL VAGINOSIS): Status: RESOLVED | Noted: 2020-05-28 | Resolved: 2021-02-25

## 2021-02-25 PROBLEM — B96.89 BV (BACTERIAL VAGINOSIS): Status: RESOLVED | Noted: 2020-05-28 | Resolved: 2021-02-25

## 2021-02-25 PROCEDURE — 99213 OFFICE O/P EST LOW 20 MIN: CPT | Performed by: NURSE PRACTITIONER

## 2021-02-25 RX ORDER — FLUCONAZOLE 150 MG/1
TABLET ORAL
Qty: 2 TABLET | Refills: 0 | Status: SHIPPED | OUTPATIENT
Start: 2021-02-25 | End: 2021-02-28

## 2021-02-25 NOTE — PROGRESS NOTES
Assessment/Plan:    Vagina, candidiasis  Exam with erythematous mucosa, neg whiff, normal pH, neg wet mount  rx provided for diflucan  Recommended conservative vulvar hygiene and f/u PRN if sx not improved  Diagnoses and all orders for this visit:    Vagina, candidiasis  -     fluconazole (DIFLUCAN) 150 mg tablet; One tablet by mouth on day one and one tablet by mouth on day four          Subjective:      Patient ID: Louann Limon is a 45 y o  female  This patient presents with c/o vulvar itching  Present for <1 day  Started PO abx yesterday for double ear infection   Always gets a yeast infection from PO abx   Urgent care declined to rx this for her   She denies d/c, odor, pain, bleeding or acute gyn complaints otherwise       The following portions of the patient's history were reviewed and updated as appropriate: allergies, current medications, past family history, past medical history, past social history, past surgical history and problem list     Review of Systems   Constitutional: Negative  Respiratory: Negative  Cardiovascular: Negative  Gastrointestinal: Negative  Genitourinary: Negative  Vulvar itching    Musculoskeletal: Negative  Skin: Negative  Neurological: Negative  Psychiatric/Behavioral: Negative  Objective:      /62   Wt 61 2 kg (135 lb)   BMI 21 79 kg/m²          Physical Exam  Constitutional:       Appearance: She is well-developed  HENT:      Head: Normocephalic and atraumatic  Eyes:      Pupils: Pupils are equal, round, and reactive to light  Neck:      Musculoskeletal: Normal range of motion  Pulmonary:      Effort: Pulmonary effort is normal    Abdominal:      Hernia: There is no hernia in the left inguinal area or right inguinal area  Genitourinary:     Labia:         Right: No rash, tenderness, lesion or injury  Left: No rash, tenderness, lesion or injury         Vagina: No vaginal discharge, erythema, tenderness or bleeding  Cervix: No cervical motion tenderness, discharge or friability  Adnexa:         Left: No fullness  Rectum: Normal        Musculoskeletal: Normal range of motion  Skin:     General: Skin is warm and dry  Neurological:      Mental Status: She is alert and oriented to person, place, and time  Psychiatric:         Behavior: Behavior normal          Thought Content:  Thought content normal          Judgment: Judgment normal

## 2021-02-25 NOTE — ASSESSMENT & PLAN NOTE
Exam with erythematous mucosa, neg whiff, normal pH, neg wet mount  rx provided for diflucan  Recommended conservative vulvar hygiene and f/u PRN if sx not improved

## 2021-05-28 ENCOUNTER — OFFICE VISIT (OUTPATIENT)
Dept: URGENT CARE | Facility: MEDICAL CENTER | Age: 51
End: 2021-05-28
Payer: COMMERCIAL

## 2021-05-28 VITALS
RESPIRATION RATE: 18 BRPM | HEIGHT: 66 IN | WEIGHT: 128 LBS | HEART RATE: 78 BPM | TEMPERATURE: 98.6 F | OXYGEN SATURATION: 100 % | BODY MASS INDEX: 20.57 KG/M2

## 2021-05-28 DIAGNOSIS — H66.92 LEFT OTITIS MEDIA, UNSPECIFIED OTITIS MEDIA TYPE: Primary | ICD-10-CM

## 2021-05-28 PROCEDURE — 99213 OFFICE O/P EST LOW 20 MIN: CPT | Performed by: PHYSICIAN ASSISTANT

## 2021-05-28 PROCEDURE — S9088 SERVICES PROVIDED IN URGENT: HCPCS | Performed by: PHYSICIAN ASSISTANT

## 2021-05-28 RX ORDER — AMOXICILLIN 500 MG/1
500 CAPSULE ORAL EVERY 8 HOURS SCHEDULED
Qty: 21 CAPSULE | Refills: 0 | Status: SHIPPED | OUTPATIENT
Start: 2021-05-28 | End: 2021-06-04
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
## 2021-05-28 NOTE — PATIENT INSTRUCTIONS
Left otitis media  Amoxicillin as directed  corstiporin as directed  Follow up with PCP in 3-5 days  Proceed to  ER if symptoms worsen  Ear Infection   WHAT YOU NEED TO KNOW:   An ear infection is also called otitis media  An ear infection may be caused by blocked or swollen eustachian tubes  Eustachian tubes connect the middle ear to the back of the nose and throat  They drain fluid from the middle ear  With an ear infection, fluid builds up and is infected by germs  The germs grow easily in fluid trapped behind the eardrum  DISCHARGE INSTRUCTIONS:   Call 911 or have someone call 911 for the following:   · You have a seizure  Return to the emergency department if:   · You have a fever and a stiff neck  Contact your healthcare provider if:   · Your ear pain gets worse or does not go away, even after treatment  · The outside of your ear is red or swollen  · You are vomiting or have diarrhea  · You have fluid coming from your ear  · You have questions or concerns about your condition or care  Medicines: You may  need any of the following:  · Acetaminophen  decreases pain and fever  It is available without a doctor's order  Ask how much to take and how often to take it  Follow directions  Read the labels of all other medicines you are using to see if they also contain acetaminophen, or ask your doctor or pharmacist  Acetaminophen can cause liver damage if not taken correctly  Do not use more than 4 grams (4,000 milligrams) total of acetaminophen in one day  · NSAIDs , such as ibuprofen, help decrease swelling, pain, and fever  This medicine is available with or without a doctor's order  NSAIDs can cause stomach bleeding or kidney problems in certain people  If you take blood thinner medicine, always ask your healthcare provider if NSAIDs are safe for you  Always read the medicine label and follow directions  · Ear drops  help treat your ear pain      · Antibiotics  help treat a bacterial infection that caused your ear infection  · Take your medicine as directed  Contact your healthcare provider if you think your medicine is not helping or if you have side effects  Tell him or her if you are allergic to any medicine  Keep a list of the medicines, vitamins, and herbs you take  Include the amounts, and when and why you take them  Bring the list or the pill bottles to follow-up visits  Carry your medicine list with you in case of an emergency  Heat or ice:   · Apply heat  on your ear for 15 to 20 minutes, 3 to 4 times a day or as directed  Heat helps decrease pain  · Apply ice  on your ear for 15 to 20 minutes, 3 to 4 times a day for 2 days or as directed  Use an ice pack, or put crushed ice in a plastic bag  Cover it with a towel before you apply it to your ear  Ice decreases swelling and pain  Prevent an ear infection:   · Wash your hands often  Use soap and water  Wash your hands after you use the bathroom, change a child's diapers, or sneeze  Wash your hands before you prepare or eat food  · Stay away from people who are ill  Some germs are easily and quickly spread through contact  Return to work or school: You may return to work or school when your fever is gone  Follow up with your healthcare provider as directed:  Write down your questions so you remember to ask them during your visits  © Copyright [address] Information is for End User's use only and may not be sold, redistributed or otherwise used for commercial purposes  All illustrations and images included in CareNotes® are the copyrighted property of A D A M , Inc  or [facility] Coretta Carter   The above information is an  only  It is not intended as medical advice for individual conditions or treatments  Talk to your doctor, nurse or pharmacist before following any medical regimen to see if it is safe and effective for you

## 2021-06-24 ENCOUNTER — HOSPITAL ENCOUNTER (EMERGENCY)
Facility: HOSPITAL | Age: 51
Discharge: HOME | End: 2021-06-24
Attending: EMERGENCY MEDICINE
Payer: COMMERCIAL

## 2021-06-24 VITALS
OXYGEN SATURATION: 100 % | RESPIRATION RATE: 18 BRPM | WEIGHT: 128 LBS | HEART RATE: 66 BPM | HEIGHT: 66 IN | TEMPERATURE: 98.2 F | BODY MASS INDEX: 20.57 KG/M2

## 2021-06-24 DIAGNOSIS — S86.012A ACHILLES TENDON RUPTURE, LEFT, INITIAL ENCOUNTER: Primary | ICD-10-CM

## 2021-06-24 PROCEDURE — 99283 EMERGENCY DEPT VISIT LOW MDM: CPT

## 2021-06-24 PROCEDURE — 29515 APPLICATION SHORT LEG SPLINT: CPT | Performed by: EMERGENCY MEDICINE

## 2021-06-24 PROCEDURE — 99282 EMERGENCY DEPT VISIT SF MDM: CPT | Performed by: EMERGENCY MEDICINE

## 2021-06-25 ENCOUNTER — TELEPHONE (OUTPATIENT)
Dept: OBGYN CLINIC | Facility: HOSPITAL | Age: 51
End: 2021-06-25

## 2021-06-25 NOTE — TELEPHONE ENCOUNTER
Patients spouse is calling in wanting to make an appointment for her to be seen, he was not sure what type of medicaid insurance she has and will call back to confirm
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
## 2021-07-22 ENCOUNTER — VBI (OUTPATIENT)
Dept: ADMINISTRATIVE | Facility: OTHER | Age: 51
End: 2021-07-22

## 2021-10-25 NOTE — PROGRESS NOTES
Caller: Will Lugo    Relationship: Self      Medication requested (name and dosage):     Requested Prescriptions:   Requested Prescriptions     Pending Prescriptions Disp Refills   • levothyroxine (SYNTHROID, LEVOTHROID) 125 MCG tablet 90 tablet 3     Sig: Take 1 tablet by mouth Daily.        Pharmacy where request should be sent: [facility]     Additional details provided by patient: PATIENT SAYS THE PRESCRIPTION IS , HE NEEDS A NEW ONE WRITTEN AND REFILLED.     Best call back number:     Does the patient have less than a 3 day supply:  [x] Yes  [] No    Johan Merino Rep   10/25/21 15:23 EDT          Pt is anticipating discharge tomorrow  States she feels improvement in mood since admission  Only c/o was waking early at 0400, states she did not take her xanax for sleep, but will tonight  Pt states everything is "fine", seems avoidant of discussing any issues in depth, superficial in her responses  Did say she was worried about an abnormal ekg the doctor told her about, but she plans to follow up with her family doctor after discharge   Mild anxiety, denies SI

## 2021-11-01 ENCOUNTER — ANNUAL EXAM (OUTPATIENT)
Dept: OBGYN CLINIC | Facility: CLINIC | Age: 51
End: 2021-11-01
Payer: COMMERCIAL

## 2021-11-01 VITALS
HEIGHT: 66 IN | SYSTOLIC BLOOD PRESSURE: 110 MMHG | DIASTOLIC BLOOD PRESSURE: 70 MMHG | WEIGHT: 134 LBS | BODY MASS INDEX: 21.53 KG/M2

## 2021-11-01 DIAGNOSIS — Z01.419 ROUTINE GYNECOLOGICAL EXAMINATION: ICD-10-CM

## 2021-11-01 DIAGNOSIS — R10.2 PELVIC PAIN IN FEMALE: ICD-10-CM

## 2021-11-01 DIAGNOSIS — Z11.51 SCREENING FOR HPV (HUMAN PAPILLOMAVIRUS): ICD-10-CM

## 2021-11-01 DIAGNOSIS — Z12.12 SCREENING FOR COLORECTAL CANCER: ICD-10-CM

## 2021-11-01 DIAGNOSIS — Z12.11 SCREENING FOR COLORECTAL CANCER: ICD-10-CM

## 2021-11-01 DIAGNOSIS — Z12.31 ENCOUNTER FOR SCREENING MAMMOGRAM FOR BREAST CANCER: Primary | ICD-10-CM

## 2021-11-01 DIAGNOSIS — R82.90 BAD ODOR OF URINE: ICD-10-CM

## 2021-11-01 LAB
SL AMB  POCT GLUCOSE, UA: NEGATIVE
SL AMB LEUKOCYTE ESTERASE,UA: NEGATIVE
SL AMB POCT BILIRUBIN,UA: NEGATIVE
SL AMB POCT BLOOD,UA: NEGATIVE
SL AMB POCT CLARITY,UA: CLEAR
SL AMB POCT COLOR,UA: YELLOW
SL AMB POCT KETONES,UA: NEGATIVE
SL AMB POCT NITRITE,UA: NEGATIVE
SL AMB POCT PH,UA: 5
SL AMB POCT SPECIFIC GRAVITY,UA: 1000
SL AMB POCT URINE PROTEIN: NEGATIVE
SL AMB POCT UROBILINOGEN: NORMAL

## 2021-11-01 PROCEDURE — G0101 CA SCREEN;PELVIC/BREAST EXAM: HCPCS | Performed by: OBSTETRICS & GYNECOLOGY

## 2021-11-01 RX ORDER — GLUCOSAMINE/D3/BOSWELLIA SERRA 1500MG-400
1 TABLET ORAL DAILY
COMMUNITY

## 2021-11-01 RX ORDER — HYDROCORTISONE 1 %
1 OINTMENT (GRAM) TOPICAL DAILY
COMMUNITY

## 2021-11-06 ENCOUNTER — NURSE TRIAGE (OUTPATIENT)
Dept: OTHER | Facility: OTHER | Age: 51
End: 2021-11-06

## 2021-11-06 DIAGNOSIS — Z11.59 SPECIAL SCREENING EXAMINATION FOR VIRAL DISEASE: Primary | ICD-10-CM

## 2021-11-06 PROCEDURE — U0005 INFEC AGEN DETEC AMPLI PROBE: HCPCS | Performed by: FAMILY MEDICINE

## 2021-11-06 PROCEDURE — U0003 INFECTIOUS AGENT DETECTION BY NUCLEIC ACID (DNA OR RNA); SEVERE ACUTE RESPIRATORY SYNDROME CORONAVIRUS 2 (SARS-COV-2) (CORONAVIRUS DISEASE [COVID-19]), AMPLIFIED PROBE TECHNIQUE, MAKING USE OF HIGH THROUGHPUT TECHNOLOGIES AS DESCRIBED BY CMS-2020-01-R: HCPCS | Performed by: FAMILY MEDICINE

## 2021-11-08 ENCOUNTER — HOSPITAL ENCOUNTER (EMERGENCY)
Facility: HOSPITAL | Age: 51
Discharge: HOME | End: 2021-11-08
Attending: EMERGENCY MEDICINE | Admitting: EMERGENCY MEDICINE
Payer: COMMERCIAL

## 2021-11-08 ENCOUNTER — NURSE TRIAGE (OUTPATIENT)
Dept: OTHER | Facility: OTHER | Age: 51
End: 2021-11-08

## 2021-11-08 VITALS
HEART RATE: 88 BPM | OXYGEN SATURATION: 100 % | SYSTOLIC BLOOD PRESSURE: 125 MMHG | TEMPERATURE: 100.3 F | DIASTOLIC BLOOD PRESSURE: 93 MMHG | RESPIRATION RATE: 20 BRPM

## 2021-11-08 DIAGNOSIS — Z20.822 SUSPECTED COVID-19 VIRUS INFECTION: Primary | ICD-10-CM

## 2021-11-08 PROCEDURE — U0005 INFEC AGEN DETEC AMPLI PROBE: HCPCS | Performed by: EMERGENCY MEDICINE

## 2021-11-08 PROCEDURE — 99284 EMERGENCY DEPT VISIT MOD MDM: CPT

## 2021-11-08 PROCEDURE — U0003 INFECTIOUS AGENT DETECTION BY NUCLEIC ACID (DNA OR RNA); SEVERE ACUTE RESPIRATORY SYNDROME CORONAVIRUS 2 (SARS-COV-2) (CORONAVIRUS DISEASE [COVID-19]), AMPLIFIED PROBE TECHNIQUE, MAKING USE OF HIGH THROUGHPUT TECHNOLOGIES AS DESCRIBED BY CMS-2020-01-R: HCPCS | Performed by: EMERGENCY MEDICINE

## 2021-11-08 PROCEDURE — 99284 EMERGENCY DEPT VISIT MOD MDM: CPT | Performed by: EMERGENCY MEDICINE

## 2021-11-08 PROCEDURE — NC001 PR NO CHARGE: Performed by: PHYSICIAN ASSISTANT

## 2021-11-08 RX ORDER — ACETAMINOPHEN 325 MG/1
975 TABLET ORAL ONCE
Status: COMPLETED | OUTPATIENT
Start: 2021-11-08 | End: 2021-11-08

## 2021-11-08 RX ORDER — ACETAMINOPHEN 500 MG
500 TABLET ORAL EVERY 6 HOURS PRN
Qty: 30 TABLET | Refills: 0 | Status: SHIPPED | OUTPATIENT
Start: 2021-11-08

## 2021-11-08 RX ORDER — SODIUM CHLORIDE, SODIUM GLUCONATE, SODIUM ACETATE, POTASSIUM CHLORIDE, MAGNESIUM CHLORIDE, SODIUM PHOSPHATE, DIBASIC, AND POTASSIUM PHOSPHATE .53; .5; .37; .037; .03; .012; .00082 G/100ML; G/100ML; G/100ML; G/100ML; G/100ML; G/100ML; G/100ML
500 INJECTION, SOLUTION INTRAVENOUS ONCE
Status: DISCONTINUED | OUTPATIENT
Start: 2021-11-08 | End: 2021-11-08

## 2021-11-08 RX ORDER — ACETAMINOPHEN 325 MG/1
975 TABLET ORAL ONCE
Status: DISCONTINUED | OUTPATIENT
Start: 2021-11-08 | End: 2021-11-08

## 2021-11-08 RX ORDER — ONDANSETRON 4 MG/1
4 TABLET, ORALLY DISINTEGRATING ORAL EVERY 6 HOURS PRN
Qty: 8 TABLET | Refills: 0 | Status: SHIPPED | OUTPATIENT
Start: 2021-11-08

## 2021-11-08 RX ORDER — IBUPROFEN 400 MG/1
400 TABLET ORAL EVERY 6 HOURS PRN
Qty: 30 TABLET | Refills: 0 | Status: SHIPPED | OUTPATIENT
Start: 2021-11-08 | End: 2021-11-10 | Stop reason: ALTCHOICE

## 2021-11-08 RX ADMIN — ACETAMINOPHEN 975 MG: 325 TABLET, FILM COATED ORAL at 18:41

## 2021-11-10 ENCOUNTER — TELEMEDICINE (OUTPATIENT)
Dept: FAMILY MEDICINE CLINIC | Facility: CLINIC | Age: 51
End: 2021-11-10
Payer: COMMERCIAL

## 2021-11-10 VITALS — TEMPERATURE: 98.3 F

## 2021-11-10 DIAGNOSIS — U07.1 COVID-19 VIRUS INFECTION: Primary | ICD-10-CM

## 2021-11-10 LAB — SARS-COV-2 RNA RESP QL NAA+PROBE: POSITIVE

## 2021-11-10 PROCEDURE — 99214 OFFICE O/P EST MOD 30 MIN: CPT | Performed by: NURSE PRACTITIONER

## 2021-11-10 RX ORDER — ONDANSETRON 2 MG/ML
4 INJECTION INTRAMUSCULAR; INTRAVENOUS ONCE AS NEEDED
Status: CANCELLED | OUTPATIENT
Start: 2021-11-11

## 2021-11-10 RX ORDER — ALBUTEROL SULFATE 90 UG/1
3 AEROSOL, METERED RESPIRATORY (INHALATION) ONCE AS NEEDED
Status: CANCELLED | OUTPATIENT
Start: 2021-11-11

## 2021-11-10 RX ORDER — SODIUM CHLORIDE 9 MG/ML
20 INJECTION, SOLUTION INTRAVENOUS ONCE
Status: CANCELLED | OUTPATIENT
Start: 2021-11-11

## 2021-11-10 RX ORDER — ASCORBIC ACID 500 MG
500 TABLET ORAL DAILY
COMMUNITY

## 2021-11-10 RX ORDER — B-COMPLEX WITH VITAMIN C
1 TABLET ORAL DAILY
COMMUNITY

## 2021-11-11 ENCOUNTER — HOSPITAL ENCOUNTER (OUTPATIENT)
Dept: INFUSION CENTER | Facility: HOSPITAL | Age: 51
Discharge: HOME | End: 2021-11-11
Payer: COMMERCIAL

## 2021-11-11 ENCOUNTER — TELEMEDICINE (OUTPATIENT)
Dept: FAMILY MEDICINE CLINIC | Facility: CLINIC | Age: 51
End: 2021-11-11
Payer: COMMERCIAL

## 2021-11-11 VITALS
TEMPERATURE: 98.2 F | SYSTOLIC BLOOD PRESSURE: 120 MMHG | RESPIRATION RATE: 17 BRPM | HEART RATE: 70 BPM | OXYGEN SATURATION: 98 % | DIASTOLIC BLOOD PRESSURE: 60 MMHG

## 2021-11-11 VITALS — TEMPERATURE: 99.3 F

## 2021-11-11 DIAGNOSIS — T78.40XA ALLERGIC REACTION, INITIAL ENCOUNTER: Primary | ICD-10-CM

## 2021-11-11 DIAGNOSIS — U07.1 COVID-19 VIRUS INFECTION: Primary | ICD-10-CM

## 2021-11-11 DIAGNOSIS — U07.1 COVID-19 VIRUS INFECTION: ICD-10-CM

## 2021-11-11 PROCEDURE — M0243 CASIRIVI AND IMDEVI INFUSION: HCPCS | Performed by: FAMILY MEDICINE

## 2021-11-11 PROCEDURE — 99214 OFFICE O/P EST MOD 30 MIN: CPT | Performed by: NURSE PRACTITIONER

## 2021-11-11 RX ORDER — DIPHENHYDRAMINE HYDROCHLORIDE 50 MG/ML
50 INJECTION INTRAMUSCULAR; INTRAVENOUS ONCE
Status: COMPLETED | OUTPATIENT
Start: 2021-11-11 | End: 2021-11-11

## 2021-11-11 RX ORDER — SODIUM CHLORIDE 9 MG/ML
20 INJECTION, SOLUTION INTRAVENOUS ONCE
Status: CANCELLED | OUTPATIENT
Start: 2021-11-11

## 2021-11-11 RX ORDER — ONDANSETRON 2 MG/ML
4 INJECTION INTRAMUSCULAR; INTRAVENOUS ONCE AS NEEDED
Status: CANCELLED | OUTPATIENT
Start: 2021-11-11

## 2021-11-11 RX ORDER — ONDANSETRON 2 MG/ML
4 INJECTION INTRAMUSCULAR; INTRAVENOUS ONCE AS NEEDED
Status: COMPLETED | OUTPATIENT
Start: 2021-11-11 | End: 2021-11-11

## 2021-11-11 RX ORDER — ALBUTEROL SULFATE 90 UG/1
3 AEROSOL, METERED RESPIRATORY (INHALATION) ONCE AS NEEDED
Status: CANCELLED | OUTPATIENT
Start: 2021-11-11

## 2021-11-11 RX ORDER — FAMOTIDINE 20 MG/50ML
20 INJECTION, SOLUTION INTRAVENOUS ONCE
Status: COMPLETED | OUTPATIENT
Start: 2021-11-11 | End: 2021-11-11

## 2021-11-11 RX ORDER — ALBUTEROL SULFATE 90 UG/1
3 AEROSOL, METERED RESPIRATORY (INHALATION) ONCE AS NEEDED
Status: DISCONTINUED | OUTPATIENT
Start: 2021-11-11 | End: 2021-11-14 | Stop reason: HOSPADM

## 2021-11-11 RX ORDER — SODIUM CHLORIDE 9 MG/ML
20 INJECTION, SOLUTION INTRAVENOUS ONCE
Status: COMPLETED | OUTPATIENT
Start: 2021-11-11 | End: 2021-11-11

## 2021-11-11 RX ADMIN — HYDROCORTISONE SODIUM SUCCINATE 100 MG: 100 INJECTION, POWDER, FOR SOLUTION INTRAMUSCULAR; INTRAVENOUS at 09:46

## 2021-11-11 RX ADMIN — DIPHENHYDRAMINE HYDROCHLORIDE 25 MG: 50 INJECTION, SOLUTION INTRAMUSCULAR; INTRAVENOUS at 09:46

## 2021-11-11 RX ADMIN — SODIUM CHLORIDE 20 ML/HR: 9 INJECTION, SOLUTION INTRAVENOUS at 09:25

## 2021-11-11 RX ADMIN — CASIRIVIMAB AND IMDEVIMAB 1200 MG COMBINED: 600; 600 INJECTION, SOLUTION, CONCENTRATE INTRAVENOUS at 09:30

## 2021-11-11 RX ADMIN — ONDANSETRON 4 MG: 2 INJECTION INTRAMUSCULAR; INTRAVENOUS at 09:45

## 2021-11-11 RX ADMIN — FAMOTIDINE 20 MG: 20 INJECTION, SOLUTION INTRAVENOUS at 09:47

## 2021-11-12 ENCOUNTER — TELEMEDICINE (OUTPATIENT)
Dept: FAMILY MEDICINE CLINIC | Facility: CLINIC | Age: 51
End: 2021-11-12
Payer: COMMERCIAL

## 2021-11-12 VITALS — TEMPERATURE: 98.6 F

## 2021-11-12 DIAGNOSIS — U07.1 COVID-19 VIRUS INFECTION: Primary | ICD-10-CM

## 2021-11-12 PROBLEM — T78.40XA ALLERGIC REACTION: Status: ACTIVE | Noted: 2021-11-12

## 2021-11-12 PROCEDURE — 99213 OFFICE O/P EST LOW 20 MIN: CPT | Performed by: NURSE PRACTITIONER

## 2021-11-18 DIAGNOSIS — A60.9 HSV (HERPES SIMPLEX VIRUS) ANOGENITAL INFECTION: Primary | ICD-10-CM

## 2021-11-18 RX ORDER — ACYCLOVIR 50 MG/G
OINTMENT TOPICAL DAILY PRN
Qty: 15 G | Refills: 0 | Status: SHIPPED | OUTPATIENT
Start: 2021-11-18 | End: 2022-02-07 | Stop reason: SDUPTHER

## 2021-11-18 RX ORDER — VALACYCLOVIR HYDROCHLORIDE 500 MG/1
500 TABLET, FILM COATED ORAL DAILY
Qty: 3 TABLET | Refills: 1 | Status: SHIPPED | OUTPATIENT
Start: 2021-11-18 | End: 2022-05-05 | Stop reason: SDUPTHER

## 2021-11-22 ENCOUNTER — HOSPITAL ENCOUNTER (OUTPATIENT)
Dept: RADIOLOGY | Facility: MEDICAL CENTER | Age: 51
Discharge: HOME | End: 2021-11-22
Attending: OBSTETRICS & GYNECOLOGY
Payer: COMMERCIAL

## 2021-11-22 DIAGNOSIS — R10.2 PELVIC PAIN IN FEMALE: ICD-10-CM

## 2021-11-22 PROCEDURE — 76830 TRANSVAGINAL US NON-OB: CPT

## 2021-11-22 PROCEDURE — 76856 US EXAM PELVIC COMPLETE: CPT

## 2021-11-30 ENCOUNTER — CONSULT (OUTPATIENT)
Dept: SURGERY | Facility: CLINIC | Age: 51
End: 2021-11-30
Payer: COMMERCIAL

## 2021-11-30 VITALS — HEIGHT: 65 IN | TEMPERATURE: 97.6 F | BODY MASS INDEX: 22.39 KG/M2 | WEIGHT: 134.4 LBS

## 2021-11-30 DIAGNOSIS — R10.31 RIGHT GROIN PAIN: Primary | ICD-10-CM

## 2021-11-30 DIAGNOSIS — R10.2 PELVIC PAIN IN FEMALE: ICD-10-CM

## 2021-11-30 PROCEDURE — 99203 OFFICE O/P NEW LOW 30 MIN: CPT | Performed by: SURGERY

## 2021-11-30 PROCEDURE — 3008F BODY MASS INDEX DOCD: CPT | Performed by: SURGERY

## 2021-11-30 PROCEDURE — 64425 NJX AA&/STRD II IH NERVES: CPT | Performed by: SURGERY

## 2021-11-30 RX ORDER — PRAZOSIN HYDROCHLORIDE 1 MG/1
1 CAPSULE ORAL
COMMUNITY
Start: 2021-11-29 | End: 2022-11-29

## 2022-02-07 DIAGNOSIS — A60.9 HSV (HERPES SIMPLEX VIRUS) ANOGENITAL INFECTION: ICD-10-CM

## 2022-02-07 RX ORDER — ACYCLOVIR 50 MG/G
OINTMENT TOPICAL DAILY PRN
Qty: 15 G | Refills: 2 | Status: SHIPPED | OUTPATIENT
Start: 2022-02-07 | End: 2022-05-05 | Stop reason: SDUPTHER

## 2022-02-07 NOTE — TELEPHONE ENCOUNTER
[facility] requesting refill on Acyclovir 5% ointment 15g tube  Due for annual in November    Added 2 refills since patient just had RX filled one month ago

## 2022-05-05 ENCOUNTER — VBI (OUTPATIENT)
Dept: ADMINISTRATIVE | Facility: OTHER | Age: 52
End: 2022-05-05

## 2022-05-05 DIAGNOSIS — A60.9 HSV (HERPES SIMPLEX VIRUS) ANOGENITAL INFECTION: ICD-10-CM

## 2022-05-05 RX ORDER — ACYCLOVIR 50 MG/G
OINTMENT TOPICAL DAILY PRN
Qty: 15 G | Refills: 0 | Status: SHIPPED | OUTPATIENT
Start: 2022-05-05

## 2022-05-05 RX ORDER — VALACYCLOVIR HYDROCHLORIDE 500 MG/1
500 TABLET, FILM COATED ORAL DAILY
Qty: 3 TABLET | Refills: 0 | Status: SHIPPED | OUTPATIENT
Start: 2022-05-05 | End: 2022-05-08

## 2022-05-05 NOTE — TELEPHONE ENCOUNTER
Patient is requesting refills for Zovirax ointment and Valtrex  Patient had yearly on 11/1/21 and has history of HSV

## 2022-11-18 ENCOUNTER — VBI (OUTPATIENT)
Dept: ADMINISTRATIVE | Facility: OTHER | Age: 52
End: 2022-11-18

## 2023-03-13 ENCOUNTER — VBI (OUTPATIENT)
Dept: ADMINISTRATIVE | Facility: OTHER | Age: 53
End: 2023-03-13

## 2023-10-19 ENCOUNTER — VBI (OUTPATIENT)
Dept: ADMINISTRATIVE | Facility: OTHER | Age: 53
End: 2023-10-19

## 2023-12-07 ENCOUNTER — OCCMED (OUTPATIENT)
Dept: URGENT CARE | Facility: MEDICAL CENTER | Age: 53
End: 2023-12-07

## 2023-12-07 DIAGNOSIS — Z02.1 PHYSICAL EXAM, PRE-EMPLOYMENT: ICD-10-CM

## 2023-12-07 PROCEDURE — 86480 TB TEST CELL IMMUN MEASURE: CPT | Performed by: PHYSICIAN ASSISTANT

## 2023-12-08 LAB
GAMMA INTERFERON BACKGROUND BLD IA-ACNC: 0.03 IU/ML
M TB IFN-G BLD-IMP: NEGATIVE
M TB IFN-G CD4+ BCKGRND COR BLD-ACNC: 0 IU/ML
M TB IFN-G CD4+ BCKGRND COR BLD-ACNC: 0 IU/ML
MITOGEN IGNF BCKGRD COR BLD-ACNC: 7.11 IU/ML

## 2023-12-14 ENCOUNTER — VBI (OUTPATIENT)
Dept: ADMINISTRATIVE | Facility: OTHER | Age: 53
End: 2023-12-14

## 2025-03-16 ENCOUNTER — HOSPITAL ENCOUNTER (EMERGENCY)
Facility: HOSPITAL | Age: 55
Discharge: HOME | End: 2025-03-16
Attending: EMERGENCY MEDICINE

## 2025-03-16 ENCOUNTER — APPOINTMENT (EMERGENCY)
Dept: RADIOLOGY | Facility: HOSPITAL | Age: 55
End: 2025-03-16

## 2025-03-16 VITALS
RESPIRATION RATE: 13 BRPM | HEART RATE: 66 BPM | DIASTOLIC BLOOD PRESSURE: 60 MMHG | OXYGEN SATURATION: 97 % | SYSTOLIC BLOOD PRESSURE: 128 MMHG

## 2025-03-16 DIAGNOSIS — R51.9 HEADACHE: ICD-10-CM

## 2025-03-16 DIAGNOSIS — R29.90 STROKE-LIKE SYMPTOMS: Primary | ICD-10-CM

## 2025-03-16 PROBLEM — G43.909 MIGRAINE: Status: ACTIVE | Noted: 2025-03-16

## 2025-03-16 LAB
ANION GAP SERPL CALCULATED.3IONS-SCNC: 6 MMOL/L (ref 4–13)
APTT PPP: 25 SECONDS (ref 23–34)
BUN SERPL-MCNC: 24 MG/DL (ref 5–25)
CALCIUM SERPL-MCNC: 8.5 MG/DL (ref 8.4–10.2)
CARDIAC TROPONIN I PNL SERPL HS: 3 NG/L (ref ?–50)
CHLORIDE SERPL-SCNC: 108 MMOL/L (ref 96–108)
CO2 SERPL-SCNC: 26 MMOL/L (ref 21–32)
CREAT SERPL-MCNC: 0.85 MG/DL (ref 0.6–1.3)
ERYTHROCYTE [DISTWIDTH] IN BLOOD BY AUTOMATED COUNT: 12.7 % (ref 11.6–15.1)
GFR SERPL CREATININE-BSD FRML MDRD: 76 ML/MIN/1.73SQ M
GLUCOSE SERPL-MCNC: 91 MG/DL (ref 65–140)
GLUCOSE SERPL-MCNC: 94 MG/DL (ref 65–140)
HCT VFR BLD AUTO: 36 % (ref 34.8–46.1)
HGB BLD-MCNC: 12.2 G/DL (ref 11.5–15.4)
INR PPP: 0.83 (ref 0.85–1.19)
MCH RBC QN AUTO: 30.9 PG (ref 26.8–34.3)
MCHC RBC AUTO-ENTMCNC: 33.9 G/DL (ref 31.4–37.4)
MCV RBC AUTO: 91 FL (ref 82–98)
PLATELET # BLD AUTO: 381 THOUSANDS/UL (ref 149–390)
PMV BLD AUTO: 9.6 FL (ref 8.9–12.7)
POTASSIUM SERPL-SCNC: 4.2 MMOL/L (ref 3.5–5.3)
PROTHROMBIN TIME: 11.7 SECONDS (ref 12.3–15)
RBC # BLD AUTO: 3.95 MILLION/UL (ref 3.81–5.12)
SODIUM SERPL-SCNC: 140 MMOL/L (ref 135–147)
WBC # BLD AUTO: 8.52 THOUSAND/UL (ref 4.31–10.16)

## 2025-03-16 PROCEDURE — 70498 CT ANGIOGRAPHY NECK: CPT

## 2025-03-16 PROCEDURE — 99284 EMERGENCY DEPT VISIT MOD MDM: CPT

## 2025-03-16 PROCEDURE — 82948 REAGENT STRIP/BLOOD GLUCOSE: CPT

## 2025-03-16 PROCEDURE — 85027 COMPLETE CBC AUTOMATED: CPT

## 2025-03-16 PROCEDURE — 36415 COLL VENOUS BLD VENIPUNCTURE: CPT

## 2025-03-16 PROCEDURE — 84484 ASSAY OF TROPONIN QUANT: CPT

## 2025-03-16 PROCEDURE — 96375 TX/PRO/DX INJ NEW DRUG ADDON: CPT

## 2025-03-16 PROCEDURE — 85730 THROMBOPLASTIN TIME PARTIAL: CPT

## 2025-03-16 PROCEDURE — 80048 BASIC METABOLIC PNL TOTAL CA: CPT

## 2025-03-16 PROCEDURE — 85610 PROTHROMBIN TIME: CPT

## 2025-03-16 PROCEDURE — 93005 ELECTROCARDIOGRAM TRACING: CPT

## 2025-03-16 PROCEDURE — 70496 CT ANGIOGRAPHY HEAD: CPT

## 2025-03-16 PROCEDURE — 96365 THER/PROPH/DIAG IV INF INIT: CPT

## 2025-03-16 PROCEDURE — 99245 OFF/OP CONSLTJ NEW/EST HI 55: CPT | Performed by: PSYCHIATRY & NEUROLOGY

## 2025-03-16 PROCEDURE — 99291 CRITICAL CARE FIRST HOUR: CPT | Performed by: EMERGENCY MEDICINE

## 2025-03-16 RX ORDER — METOCLOPRAMIDE HYDROCHLORIDE 5 MG/ML
10 INJECTION INTRAMUSCULAR; INTRAVENOUS ONCE
Status: COMPLETED | OUTPATIENT
Start: 2025-03-16 | End: 2025-03-16

## 2025-03-16 RX ORDER — KETOROLAC TROMETHAMINE 30 MG/ML
15 INJECTION, SOLUTION INTRAMUSCULAR; INTRAVENOUS ONCE
Status: COMPLETED | OUTPATIENT
Start: 2025-03-16 | End: 2025-03-16

## 2025-03-16 RX ORDER — METOCLOPRAMIDE 10 MG/1
10 TABLET ORAL EVERY 6 HOURS
Qty: 30 TABLET | Refills: 0 | Status: SHIPPED | OUTPATIENT
Start: 2025-03-16

## 2025-03-16 RX ORDER — MAGNESIUM SULFATE HEPTAHYDRATE 40 MG/ML
2 INJECTION, SOLUTION INTRAVENOUS ONCE
Status: COMPLETED | OUTPATIENT
Start: 2025-03-16 | End: 2025-03-16

## 2025-03-16 RX ADMIN — IOHEXOL 85 ML: 350 INJECTION, SOLUTION INTRAVENOUS at 20:59

## 2025-03-16 RX ADMIN — METOCLOPRAMIDE 10 MG: 5 INJECTION, SOLUTION INTRAMUSCULAR; INTRAVENOUS at 21:00

## 2025-03-16 RX ADMIN — MAGNESIUM SULFATE HEPTAHYDRATE 2 G: 40 INJECTION, SOLUTION INTRAVENOUS at 21:00

## 2025-03-16 RX ADMIN — SODIUM CHLORIDE 1000 ML: 0.9 INJECTION, SOLUTION INTRAVENOUS at 21:00

## 2025-03-16 RX ADMIN — KETOROLAC TROMETHAMINE 15 MG: 30 INJECTION, SOLUTION INTRAMUSCULAR; INTRAVENOUS at 21:50

## 2025-03-17 LAB
ATRIAL RATE: 66 BPM
P AXIS: 23 DEGREES
PR INTERVAL: 206 MS
QRS AXIS: 2 DEGREES
QRSD INTERVAL: 88 MS
QT INTERVAL: 418 MS
QTC INTERVAL: 438 MS
T WAVE AXIS: 11 DEGREES
VENTRICULAR RATE: 66 BPM

## 2025-03-17 PROCEDURE — 93010 ELECTROCARDIOGRAM REPORT: CPT | Performed by: INTERNAL MEDICINE
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
## 2025-03-17 NOTE — DISCHARGE INSTRUCTIONS
You have been evaluated in the emergency department for headache and new neurologic symptoms.  Your symptoms have resolved with medications and your labs and imaging are all reassuring.  You are safe for discharge home.    Please continue to aggressively orally hydrate.  Please follow-up with your family doctor within 1 week to discuss your migraine medications. Please also schedule an appointment with neurology to be reevaluated for your symptoms.    Please return if you develop any new or concerning symptoms including severe sudden onset headache, new neurologic symptoms, or severe vomiting.
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
## 2025-03-17 NOTE — ED ATTENDING ATTESTATION
3/16/2025  I, Maria Luz Pate MD, saw and evaluated the patient. I have discussed the patient with the resident/non-physician practitioner and agree with the resident's/non-physician practitioner's findings, Plan of Care, and MDM as documented in the resident's/non-physician practitioner's note, except where noted. All available labs and Radiology studies were reviewed.  I was present for key portions of any procedure(s) performed by the resident/non-physician practitioner and I was immediately available to provide assistance.       At this point I agree with the current assessment done in the Emergency Department.  I have conducted an independent evaluation of this patient a history and physical is as follows:  Patient with history of migraines, typically managed with Fioricet, patient has been having worsening of her baseline migrainous disease over the last several months.  Patient has been having a severe headache for the last several days, and then today developed neurological symptoms including numbness to her right face, some dizziness, and some left-sided visual field cuts.  She does not typically get this with her migraines.  Patient states that the headache is typical of her prior migraines.  She has some photophobia.  She has not had fevers, head trauma, no underlying malignancy.  Patient has been taking her medicines without relief.  On exam the patient awake and alert.  Her face is symmetric.  She has decreased peripheral vision on her left.  She has some right beating nystagmus that does not extinguish, but is horizontal.  Her oropharynx is clear.  Her trachea is midline.  Her neck is supple.  Her cranial nerves are otherwise intact.  She has normal strength and sensation grossly.  She has normal finger-to-nose.  She has normal word repeating.  She has normal naming of objects.  She has no pronator drift. MEDICAL DECISION MAKING    Number and Complexity of Problems  Differential diagnosis: Most  likely atypical migraine, doubt stroke, but patient with new neurological deficits here    Medical Decision Making Data  External documents reviewed:   My EKG interpretation:   My CT interpretation:   My X-ray interpretation:   My ultrasound interpretation:     CT stroke alert brain   Final Result      No acute intracranial abnormality.         Findings were directly discussed with Sydnee Joshua  at 9:18 p.m. 3/16/2025.      Workstation performed: [id number]         CTA stroke alert (head/neck)   Final Result      Unremarkable CTA neck and brain.               Findings were directly discussed with Sydnee Joshua  at 9:18 p.m. 3/16/2025.      Workstation performed: [id number]             Labs Reviewed   PROTIME-INR - Abnormal       Result Value Ref Range Status    Protime 11.7 (*) 12.3 - 15.0 seconds Final    INR 0.83 (*) 0.85 - 1.19 Final    Narrative:     INR Therapeutic Range    Indication                                             INR Range      Atrial Fibrillation                                               2.0-3.0  Hypercoagulable State                                    2.0.2.3  Left Ventricular Asist Device                            2.0-3.0  Mechanical Heart Valve                                  -    Aortic(with afib, MI, embolism, HF, LA enlargement,    and/or coagulopathy)                                     2.0-3.0 (2.5-3.5)     Mitral                                                             2.5-3.5  Prosthetic/Bioprosthetic Heart Valve               2.0-3.0  Venous thromboembolism (VTE: VT, PE        2.0-3.0   CBC AND PLATELET - Normal    WBC 8.52  4.31 - 10.16 Thousand/uL Final    RBC 3.95  3.81 - 5.12 Million/uL Final    Hemoglobin 12.2  11.5 - 15.4 g/dL Final    Hematocrit 36.0  34.8 - 46.1 % Final    MCV 91  82 - 98 fL Final    MCH 30.9  26.8 - 34.3 pg Final    MCHC 33.9  31.4 - 37.4 g/dL Final    RDW 12.7  11.6 - 15.1 % Final    Platelets 381  149 - 390 Thousands/uL Final    MPV 9.6  8.9 -  "12.7 fL Final   APTT - Normal    PTT 25  23 - 34 seconds Final    Comment: Therapeutic Heparin Range =  60-90 seconds   HS TROPONIN I 0HR - Normal    hs TnI 0hr 3  \"Refer to ACS Flowchart\"- see link ng/L Final    Comment:                                              Initial (time 0) result  If >=50 ng/L, Myocardial injury suggested ;  Type of myocardial injury and treatment strategy  to be determined.  If 5-49 ng/L, a delta result at 2 hours and or 4 hours will be needed to further evaluate.  If <4 ng/L, and chest pain has been >3 hours since onset, patient may qualify for discharge based on the HEART score in the ED.  If <5 ng/L and <3hours since onset of chest pain, a delta result at 2 hours will be needed to further evaluate.    HS Troponin 99th Percentile URL of a Health Population=12 ng/L with a 95% Confidence Interval of 8-18 ng/L.    Second Troponin (time 2 hours)  If calculated delta >= 20 ng/L,  Myocardial injury suggested ; Type of myocardial injury and treatment strategy to be determined.  If 5-49 ng/L and the calculated delta is 5-19 ng/L, consult medical service for evaluation.  Continue evaluation for ischemia on ecg and other possible etiology and repeat hs troponin at 4 hours.  If delta is <5 ng/L at 2 hours, consider discharge based on risk stratification via the HEART score (if in ED), or TIANA risk score in IP/Observation.    HS Troponin 99th Percentile URL of a Health Population=12 ng/L with a 95% Confidence Interval of 8-18 ng/L.   POCT GLUCOSE - Normal    POC Glucose 94  65 - 140 mg/dl Final   BASIC METABOLIC PANEL    Sodium 140  135 - 147 mmol/L Final    Potassium 4.2  3.5 - 5.3 mmol/L Final    Chloride 108  96 - 108 mmol/L Final    CO2 26  21 - 32 mmol/L Final    ANION GAP 6  4 - 13 mmol/L Final    BUN 24  5 - 25 mg/dL Final    Creatinine 0.85  0.60 - 1.30 mg/dL Final    Comment: Standardized to IDMS reference method    Glucose 91  65 - 140 mg/dL Final    Comment: If the patient is fasting, " the ADA then defines impaired fasting glucose as > 100 mg/dL and diabetes as > or equal to 123 mg/dL.    Calcium 8.5  8.4 - 10.2 mg/dL Final    eGFR 76  ml/min/1.73sq m Final    Narrative:     National Kidney Disease Foundation guidelines for Chronic Kidney Disease (CKD):     Stage 1 with normal or high GFR (GFR > 90 mL/min/1.73 square meters)    Stage 2 Mild CKD (GFR = 60-89 mL/min/1.73 square meters)    Stage 3A Moderate CKD (GFR = 45-59 mL/min/1.73 square meters)    Stage 3B Moderate CKD (GFR = 30-44 mL/min/1.73 square meters)    Stage 4 Severe CKD (GFR = 15-29 mL/min/1.73 square meters)    Stage 5 End Stage CKD (GFR <15 mL/min/1.73 square meters)  Note: GFR calculation is accurate only with a steady state creatinine       Labs reviewed by me are significant for:     Clinical decision rules/scores are significant for:     Discussed case with:   Considered admission for:     Treatment and Disposition  ED course: Patient seen and examined.  Made stroke alert.  CT CTA, will treat symptomatically, discussed with neurology  Shared decision making:   Code status:     ED Course         Critical Care Time  Procedures    Critical Care Time Statement: Upon my evaluation, this patient had a high probability of imminent or life-threatening deterioration due to stroke alert, which required my direct attention, intervention, and personal management.  I spent a total of 33 minutes directly providing critical care services, including evaluating for the presence of life-threatening injuries or illnesses, management of organ system failure(s) , and complex medical decision making (to support/prevent further life-threatening deterioration).. This time is exclusive of procedures, teaching, treating other patients, family meetings, and any prior time recorded by providers other than myself.

## 2025-06-13 NOTE — ED NOTES
Insurance Authorization:   Phone call placed to [facility]  Phone number: [phone]  Spoke to Andrews  3 days approved  Level of care: 201  Review on 9/7/18  Authorization # [id number]  UR to be called on 9/7/18 @ [phone]; # [phone]  sent by MD

## 2025-07-18 ENCOUNTER — TELEPHONE (OUTPATIENT)
Age: 55
End: 2025-07-18

## 2025-07-18 NOTE — TELEPHONE ENCOUNTER
1ST ATTEMPT,   Called the patient. Message that this number is unreachable     No other number to call and no [facility] Form on file for this patient     Not active on My Chart     Sending letter to home address on file     Thank you,

## 2025-07-18 NOTE — TELEPHONE ENCOUNTER
HFU/ SL/ Stroke-like symptoms/Migraine    DC 3/16/2025 ( 2 hours) to Home /Self Care       Mikeblessing Villanueva DO  Resident  Neurology     Consults     Attested Addendum     Date of Service: 3/16/2025 10:24 PM      Neurology note: Lizbeth Beverly will need follow up in 8-10 weeks with headache attending or advance practitioner. She will not require outpatient neurological testing.